# Patient Record
Sex: MALE | Race: WHITE | NOT HISPANIC OR LATINO | URBAN - METROPOLITAN AREA
[De-identification: names, ages, dates, MRNs, and addresses within clinical notes are randomized per-mention and may not be internally consistent; named-entity substitution may affect disease eponyms.]

---

## 2017-01-06 ENCOUNTER — GENERIC CONVERSION - ENCOUNTER (OUTPATIENT)
Dept: OTHER | Facility: OTHER | Age: 52
End: 2017-01-06

## 2017-09-19 ENCOUNTER — GENERIC CONVERSION - ENCOUNTER (OUTPATIENT)
Dept: OTHER | Facility: OTHER | Age: 52
End: 2017-09-19

## 2017-11-09 ENCOUNTER — GENERIC CONVERSION - ENCOUNTER (OUTPATIENT)
Dept: OTHER | Facility: OTHER | Age: 52
End: 2017-11-09

## 2018-01-10 NOTE — PROGRESS NOTES
Medical Alert: Tuberculosis    High Blood Pressure    Other  Medications: Ultram    Paxil    Cetirizine Oral Tablet [Zyrtec]    Oxycontin    Lexapro  Allergies:      Dogs    other  Since Last Visit: Medical Alert: No Change    Medications: No Change    Allergies:        No Change  Pain Scale Type: Numeric Pain ScalePain Level: 0  Description:    Patient Health Assessment    Date:            11/09/2017  Blood Pressure:  129/89  Pulse:           84  Age:             52  Weight:          160 lbs  Height/Length:   5' 7"  Body Mass Index: 25 1  Provider:        SANJEEV  Clinic:          [facility]        Pt presents for #MOL   PMH review, no changes  Pt understands and consents  Applied topical benzocaine, administered 1 carps 2% lido 1:100k epi via  infiltration  Prep with 245 carbide on high speed  Caries removed with round  carbide on slow speed  Placed tofflemire/palodent matrix  Isolation with cotton   rolls and dri-angles  Etch with 37% H2PO4, rinse, dry  Applied Vividbond  with 15 second scrubx2, gentle air dry and light cured  Restored with Beautifil   flowable and Alpha II composite shade  and light cured  Refined with finishing   burs, polished with enhance point  Verified occlusion and contacts  Pt left  satisfied and ambulatory  patient doesn't like the un even look of enamel on the incisal of #8 and #9 and   would like composite build ups on the anteriors to correct of the enamel  defect      NV: restorations on #8 and 9    SA/MQ    ----- Signed on Thursday, November 09, 2017 at 9:51:12 AM  -----  ----- Provider: SANJEEV - [facility], Dentist -- Clinic: [facility] -----

## 2018-01-10 NOTE — PROGRESS NOTES
Patient Health Assessment    Date:            01/06/2017  Blood Pressure:  128/925  Pulse:           80  Age:             51  Weight:          165 lbs  Height/Length:   5' 7"  Body Mass Index: 25 7  Provider:        JEREMÍAS  Clinic:          [facility] Alert: Artificial Joints    Tuberculosis  Medications: pantoprazole Extended Release Tablet [Protonix]    Ultram    Paxil    Cetirizine Oral Tablet [Zyrtec]    Flonase    Oxycontin  Pain Scale Type: Numeric Pain ScalePain Level: 0  Description:  S: I feel like my fillings will fall out, I am going to [place] and I do not want   anything coming apart  O: Old restorations present in many teeth  #3 resin does not show anything  on film but the resin tooth interface looks suspect to me  A: Older teeth w large restorations are present  PLAN:     Pt needs prophy and then a treatment planning sessions  If he wants a  restorative treatment plan that involves full coverage then we can provide that   for him  I told him it would cost thousands of dollars  I also told him that we can patch em , pull em and plate em if that is  the plan that he would like to pursue  He asked if he should go to a dental school and have his mouth redone and  I told him that was an option       nv  prophy w doctors at [facility] and then he can discuss his next step from  the conversation with the doctors at the residency program     ----- Signed on Friday, January 06, 2017 at 10:45:21 AM  -----  ----- Provider: JEREMÍAS - Niyah Gorman DMD -- Clinic: [facility] -----

## 2018-01-12 NOTE — PROGRESS NOTES
Patient Health Assessment    Date:            09/19/2017  Blood Pressure:  122/84  Pulse:           68  Age:             52  Weight:          0 lbs  Height/Length:   0' 0"  Body Mass Index: 0 0  Provider:        JEREMÍAS  Clinic:          [address]: Tuberculosis    High Blood Pressure    Other  Medications: Ultram    Paxil    Cetirizine Oral Tablet [Zyrtec]    Oxycontin    Lexapro  Allergies:      Dogs    other  Since Last Visit: Medical Alert: No Change    Medications: No Change    Allergies:        No Change  Pain Scale Type: Numeric Pain ScalePain Level: 0  Description:    Pt presented for recall appointment  Pt is taking anxiety meds and he's  always worried or thinking that his fillings are going to fall out or break(see   previous notes as well)  Today he said that he has some discomfort on the  right side "only when doing hard physically work like riding the motorcycle" he   said  Pt not sure where on the right side, pointing at times at #3, 29 and 31  Periodic exam performed  Tooth #3 will need DOL filling since the existing filling is cracked, that  might be one of the reason that patient is experiencing some discomfort on  the right side  The bottom teeth #31,29 tested vital and no clear pathology  on any of the teeth on the lower right noted today  Explained to pt that  after we do the filling on #3 will follow up again and see if the discomfort on   the right side went away  Pt agreed  Noticed a small ulcerated area on the palate  Palatal of #3  Alike to burned  palate or irritation  Pt did not remember burning palate  No discomfort  Will  check again next visit when pt returns for filling  Today prophy completed  Used hand scalers and cavitron to clean supragingival plaque and calculus  Polished with prophy paste and cup  Advised for a better brushing and flossing  Pt agreed  Pt left in good health  Fee for resin #3 DOL for next visit given to pt      NV: resin #3    HARLEY Joyner    ----- Signed on Tuesday, September 19, 2017 at 10:54:32 AM  -----  ----- Provider: Hien Valencia -- Clinic: [facility] -----

## 2018-01-15 NOTE — RESULT NOTES
Verified Results  (1) TISSUE EXAM [id number] 11:56AM Aster Haider     Test Name Result Flag Reference   LAB AP CASE REPORT (Report)     Surgical Pathology Report             Case: [id number]                   Authorizing Provider: Abdiaziz Youngblood MD  Collected:      12/14/2016 1156        Ordering Location:   [facility] Received:      12/14/2016 6087                    Operating Room                                 Pathologist:      Valentino Poor, MD                             Specimen:  Esophagus, Cold bx eval Chu's distal esophagus   LAB AP FINAL DIAGNOSIS (Report)     A  Distal esophagus, biopsy:    - 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 or [facility]  They may not be cleared or approved by the [place]  Food and   Drug Administration  The FDA has determined that such clearance or   approval is not necessary  These tests are used for clinical purposes  They should not be regarded as investigational or for research  This   laboratory has been approved by CLIA 88, designated as a high-complexity   laboratory and is qualified to perform these tests  LAB AP GROSS DESCRIPTION (Report)     A  The specimen is received in formalin, labeled with the patient's name   and hospital number, and is designated biopsy evaluate Chu's distal   esophagus  The specimen consists of 3 tan soft tissue fragments each   measuring 0 2-0 3 cm  Entirely submitted  One cassette      Note: The estimated total formalin fixation time based upon information   provided by the submitting clinician and the standard processing schedule   is 26 0 hours      MAC

## 2018-06-08 DIAGNOSIS — K21.9 CHRONIC GERD: Primary | ICD-10-CM

## 2018-06-08 RX ORDER — PANTOPRAZOLE SODIUM 40 MG/1
40 TABLET, DELAYED RELEASE ORAL DAILY
Qty: 90 TABLET | Refills: 2 | Status: SHIPPED | OUTPATIENT
Start: 2018-06-08 | End: 2019-03-26 | Stop reason: SDUPTHER

## 2019-01-15 DIAGNOSIS — K02.61 DENTAL CARIES ON SMOOTH SURFACE LIMITED TO ENAMEL: ICD-10-CM

## 2019-03-26 DIAGNOSIS — K21.9 CHRONIC GERD: ICD-10-CM

## 2019-03-26 RX ORDER — PANTOPRAZOLE SODIUM 40 MG/1
40 TABLET, DELAYED RELEASE ORAL DAILY
Qty: 90 TABLET | Refills: 2 | Status: SHIPPED | OUTPATIENT
Start: 2019-03-26 | End: 2019-03-29 | Stop reason: SDUPTHER

## 2019-03-29 DIAGNOSIS — K21.9 CHRONIC GERD: ICD-10-CM

## 2019-04-01 RX ORDER — PANTOPRAZOLE SODIUM 40 MG/1
40 TABLET, DELAYED RELEASE ORAL DAILY
Qty: 90 TABLET | Refills: 2 | Status: SHIPPED | OUTPATIENT
Start: 2019-04-01 | End: 2020-04-17

## 2020-02-07 ENCOUNTER — DOCUMENTATION (OUTPATIENT)
Dept: GASTROENTEROLOGY | Facility: CLINIC | Age: 55
End: 2020-02-07

## 2020-02-07 NOTE — LETTER
2/7/2020    Dear Amy Johnson,    Review of our records shows you are due for the following:    EGD    Please call the following office to schedule your appointment:    [facility]    We look forward to hearing from you      Sincerely,    Dr Jose Luis Joyner

## 2020-04-16 DIAGNOSIS — K21.9 CHRONIC GERD: ICD-10-CM

## 2020-04-17 RX ORDER — PANTOPRAZOLE SODIUM 40 MG/1
TABLET, DELAYED RELEASE ORAL
Qty: 90 TABLET | Refills: 0 | Status: SHIPPED | OUTPATIENT
Start: 2020-04-17 | End: 2020-08-03

## 2020-05-12 ENCOUNTER — TELEPHONE (OUTPATIENT)
Dept: GASTROENTEROLOGY | Facility: CLINIC | Age: 55
End: 2020-05-12

## 2020-05-21 DIAGNOSIS — Z20.822 ENCOUNTER FOR LABORATORY TESTING FOR COVID-19 VIRUS: ICD-10-CM

## 2020-05-21 PROCEDURE — U0003 INFECTIOUS AGENT DETECTION BY NUCLEIC ACID (DNA OR RNA); SEVERE ACUTE RESPIRATORY SYNDROME CORONAVIRUS 2 (SARS-COV-2) (CORONAVIRUS DISEASE [COVID-19]), AMPLIFIED PROBE TECHNIQUE, MAKING USE OF HIGH THROUGHPUT TECHNOLOGIES AS DESCRIBED BY CMS-2020-01-R: HCPCS

## 2020-05-23 LAB — SARS-COV-2 RNA SPEC QL NAA+PROBE: NOT DETECTED

## 2020-05-26 ENCOUNTER — ANESTHESIA EVENT (OUTPATIENT)
Dept: GASTROENTEROLOGY | Facility: HOSPITAL | Age: 55
End: 2020-05-26

## 2020-05-27 ENCOUNTER — HOSPITAL ENCOUNTER (OUTPATIENT)
Dept: GASTROENTEROLOGY | Facility: HOSPITAL | Age: 55
Setting detail: OUTPATIENT SURGERY
Discharge: HOME | End: 2020-05-27
Attending: INTERNAL MEDICINE | Admitting: INTERNAL MEDICINE
Payer: MEDICARE

## 2020-05-27 ENCOUNTER — ANESTHESIA (OUTPATIENT)
Dept: GASTROENTEROLOGY | Facility: HOSPITAL | Age: 55
End: 2020-05-27

## 2020-05-27 VITALS
OXYGEN SATURATION: 97 % | HEIGHT: 67 IN | HEART RATE: 95 BPM | WEIGHT: 187.39 LBS | TEMPERATURE: 98 F | BODY MASS INDEX: 29.41 KG/M2 | DIASTOLIC BLOOD PRESSURE: 90 MMHG | SYSTOLIC BLOOD PRESSURE: 139 MMHG | RESPIRATION RATE: 18 BRPM

## 2020-05-27 DIAGNOSIS — K21.9 CHRONIC GERD: ICD-10-CM

## 2020-05-27 PROCEDURE — 88305 TISSUE EXAM BY PATHOLOGIST: CPT | Performed by: PATHOLOGY

## 2020-05-27 PROCEDURE — 43239 EGD BIOPSY SINGLE/MULTIPLE: CPT | Performed by: INTERNAL MEDICINE

## 2020-05-27 RX ORDER — AMLODIPINE BESYLATE AND BENAZEPRIL HYDROCHLORIDE 10; 20 MG/1; MG/1
1 CAPSULE ORAL DAILY
COMMUNITY

## 2020-05-27 RX ORDER — PROPOFOL 10 MG/ML
INJECTION, EMULSION INTRAVENOUS AS NEEDED
Status: DISCONTINUED | OUTPATIENT
Start: 2020-05-27 | End: 2020-05-27 | Stop reason: SURG

## 2020-05-27 RX ORDER — QUETIAPINE FUMARATE 100 MG/1
100 TABLET, FILM COATED ORAL
COMMUNITY

## 2020-05-27 RX ORDER — SODIUM CHLORIDE, SODIUM LACTATE, POTASSIUM CHLORIDE, CALCIUM CHLORIDE 600; 310; 30; 20 MG/100ML; MG/100ML; MG/100ML; MG/100ML
125 INJECTION, SOLUTION INTRAVENOUS CONTINUOUS
Status: DISCONTINUED | OUTPATIENT
Start: 2020-05-27 | End: 2020-05-31 | Stop reason: HOSPADM

## 2020-05-27 RX ORDER — LIDOCAINE HYDROCHLORIDE 20 MG/ML
INJECTION, SOLUTION INFILTRATION; PERINEURAL AS NEEDED
Status: DISCONTINUED | OUTPATIENT
Start: 2020-05-27 | End: 2020-05-27 | Stop reason: SURG

## 2020-05-27 RX ADMIN — SODIUM CHLORIDE, SODIUM LACTATE, POTASSIUM CHLORIDE, AND CALCIUM CHLORIDE: .6; .31; .03; .02 INJECTION, SOLUTION INTRAVENOUS at 08:20

## 2020-05-27 RX ADMIN — LIDOCAINE HYDROCHLORIDE 5 ML: 20 INJECTION, SOLUTION INFILTRATION; PERINEURAL at 08:42

## 2020-05-27 RX ADMIN — PROPOFOL 200 MG: 10 INJECTION, EMULSION INTRAVENOUS at 08:42

## 2020-05-29 ENCOUNTER — TELEPHONE (OUTPATIENT)
Dept: GASTROENTEROLOGY | Facility: CLINIC | Age: 55
End: 2020-05-29

## 2020-08-01 DIAGNOSIS — K21.9 CHRONIC GERD: ICD-10-CM

## 2020-08-03 NOTE — TELEPHONE ENCOUNTER
1200 Reyna Cuellar called refill pantoprazole 40 mg   Please call [facility] [place] at [phone] ty

## 2020-08-04 RX ORDER — PANTOPRAZOLE SODIUM 40 MG/1
TABLET, DELAYED RELEASE ORAL
Qty: 90 TABLET | Refills: 3 | Status: SHIPPED | OUTPATIENT
Start: 2020-08-04 | End: 2021-07-28

## 2021-07-27 DIAGNOSIS — K21.9 CHRONIC GERD: ICD-10-CM

## 2021-07-28 RX ORDER — PANTOPRAZOLE SODIUM 40 MG/1
TABLET, DELAYED RELEASE ORAL
Qty: 90 TABLET | Refills: 0 | Status: SHIPPED | OUTPATIENT
Start: 2021-07-28 | End: 2021-11-02

## 2021-09-09 ENCOUNTER — OFFICE VISIT (OUTPATIENT)
Dept: DENTISTRY | Facility: CLINIC | Age: 56
End: 2021-09-09

## 2021-09-09 VITALS — HEART RATE: 84 BPM | SYSTOLIC BLOOD PRESSURE: 128 MMHG | DIASTOLIC BLOOD PRESSURE: 90 MMHG | TEMPERATURE: 98 F

## 2021-09-09 DIAGNOSIS — Z01.21 ENCOUNTER FOR DENTAL EXAMINATION AND CLEANING WITH ABNORMAL FINDINGS: Primary | ICD-10-CM

## 2021-09-09 PROCEDURE — D9944 OCCLUSAL GUARD - HARD APPLIANCE, FULL ARCH: HCPCS | Performed by: DENTIST

## 2021-09-09 NOTE — PROGRESS NOTES
Leslie Cooper presents to [facility] for delivery of occlusal guard   was soaked in warm warm  Pt inserted max occlusal guard  OHI was reviewed  Post delivery instructions were given to patient  ASSESSMENT  #3 DOL caries and #20 recession    PLAN  Tooth #3 DOL composite troy still unaddressed from recall exam April 2021  Pt informed that he needs filling  Pt reported that he has sensitivity in implant when riding motorcycle or any activities that require a lot of movement  Pt pointed to the area of #19  This sensitivity is occurring because of recession on #20  Pt needs application of GLUMA and use prophy brushes at home  OHI needs to be reinforced at next visit or 6mrc  Troy planned for #24 and #27 were not completed  Pt is not having pain or sensntivity  No caries present      DONE TODAY  Delivery of maxillary occlusal guard    NV1: #3 DOL composite troy and application of dentin desensitizer #20  NV2: 6mrc and prophy    Dr Redd Smith

## 2021-11-02 DIAGNOSIS — K21.9 CHRONIC GERD: ICD-10-CM

## 2021-11-02 RX ORDER — PANTOPRAZOLE SODIUM 40 MG/1
TABLET, DELAYED RELEASE ORAL
Qty: 90 TABLET | Refills: 0 | Status: SHIPPED | OUTPATIENT
Start: 2021-11-02

## 2022-03-21 ENCOUNTER — CLINICAL SUPPORT (OUTPATIENT)
Dept: DENTISTRY | Facility: CLINIC | Age: 57
End: 2022-03-21

## 2022-03-21 VITALS — DIASTOLIC BLOOD PRESSURE: 84 MMHG | TEMPERATURE: 97.8 F | HEART RATE: 109 BPM | SYSTOLIC BLOOD PRESSURE: 121 MMHG

## 2022-03-21 DIAGNOSIS — Z01.21 ENCOUNTER FOR DENTAL EXAMINATION AND CLEANING WITH ABNORMAL FINDINGS: Primary | ICD-10-CM

## 2022-03-21 PROCEDURE — D2393 RESIN-BASED COMPOSITE - 3 SURFACES, POSTERIOR: HCPCS | Performed by: DENTIST

## 2022-03-21 NOTE — PROGRESS NOTES
Composite Filling    Aranza Adam presents for composite filling on tooth #3  Patient had pain on tooth #3  PMH reviewed, no changes  PA and BW of tooth #3 was taken  Patient has caries on the distal of the tooth  Clinical exam was done by Dr Christiane Ashley and margin of the restoration was open and patient had caries  Discussed with patient need for RCT if pulp exposure occurs or in future if pulp is inflamed  Patient told that he will need crown and might also need crown lengthening  Pt understands and consents  Applied topical benzocaine, administered 1 carps 2% lido 1:100k epi via local infiltration  Removed existing composite and prepped tooth #3 with 245 carbide on high speed  Caries removed with round carbide on slow speed  Tooth was checked with explorer and caries indicator to confirm that caries are excavated  The restoration margin was at the level of the gingiva  Placed garison matrix  Isolation with cotton rolls and dri-angles    Placed Glass ionomer at the gingival level due to lack of isolation  Etch with 37% H2PO4, rinse, dry  Applied Adhese with 20 second scrub once, gentle air dry and light cured for 10s  Restored with flowable and then Tetric bulk jay shade A2 and light cured  Refined with finishing burs, polished with enhance point  Verified occlusion and contacts  Took another BW, it shows radiolucency which can be root depression according to Dr Lindsay Villar  We will not know until tooth is prepped for a crown  If there is a root depression, crown lengthening can't be performed due to distal furcation exposure which means the tooth won't have 2 mm ferrule for the crown and crown can't be done  So Dr Lindsay Villar suggested waiting and keeping an eye on the restoration for now       Nv: resins

## 2022-04-01 ENCOUNTER — NURSE TRIAGE (OUTPATIENT)
Dept: OTHER | Facility: OTHER | Age: 57
End: 2022-04-01

## 2022-04-01 NOTE — TELEPHONE ENCOUNTER
Reason for Disposition   [1] Numbness (i e , loss of sensation) of the face, arm / hand, or leg / foot on one side of the body AND [2] gradual onset (e g , days to weeks) AND [3] present now    Answer Assessment - Initial Assessment Questions  1  SYMPTOM: "What is the main symptom you are concerned about?" (e g , weakness, numbness)      Right arm below shoulder into bicep, can't raise my arm    2  ONSET: "When did this start?" (minutes, hours, days; while sleeping)      Last week    3  LAST NORMAL: "When was the last time you were normal (no symptoms)?"      Last week    4  PATTERN "Does this come and go, or has it been constant since it started?"  "Is it present now?"      Constant    5  CARDIAC SYMPTOMS: "Have you had any of the following symptoms: chest pain, difficulty breathing, palpitations?"      Denies    6  NEUROLOGIC SYMPTOMS: "Have you had any of the following symptoms: headache, dizziness, vision loss, double vision, changes in speech, unsteady on your feet?"      Denies    7   OTHER SYMPTOMS: "Do you have any other symptoms?"    Fatigue and nauseated    Protocols used: NEUROLOGIC DEFICIT-ADULT-

## 2022-04-01 NOTE — TELEPHONE ENCOUNTER
Regarding: Had dental work done now having numbness in arm   ----- Message from Sandra Perez sent at 4/1/2022  5:30 PM EDT -----  '' I  Had dental work done about two weeks ago and now I'm having numbness in my arm concern

## 2022-04-13 ENCOUNTER — OFFICE VISIT (OUTPATIENT)
Dept: DENTISTRY | Facility: CLINIC | Age: 57
End: 2022-04-13

## 2022-04-13 VITALS — DIASTOLIC BLOOD PRESSURE: 90 MMHG | TEMPERATURE: 95.5 F | SYSTOLIC BLOOD PRESSURE: 125 MMHG | HEART RATE: 93 BPM

## 2022-04-13 DIAGNOSIS — K02.61 DENTAL CARIES ON SMOOTH SURFACE LIMITED TO ENAMEL: Primary | ICD-10-CM

## 2022-04-13 PROCEDURE — D2332 RESIN-BASED COMPOSITE - 3 SURFACES, ANTERIOR: HCPCS | Performed by: DENTIST

## 2022-04-13 PROCEDURE — D2335 RESIN-BASED COMPOSITE - 4 OR MORE SURFACES OR INVOLVING INCISAL ANGLE (ANTERIOR): HCPCS | Performed by: DENTIST

## 2022-04-13 NOTE — PROGRESS NOTES
Sandre Sieve presents for composite fillings #24-DIF and #27-DIF  PMH reviewed, no changes noted  These fillings are for non-carious lesions limited to enamel (small enamel fractures)  Showed patient the areas in question in the mirror before proceeding - patient consented to fillings in these areas  Limited to enamel - no anesthesia given  Patient reported being comfortable throughout procedure  Prepped tooth #24 and #27 with small round bur on high speed  Isolation with cotton rolls  Etched with 37% H2PO4, scrubbed with Adhese, and restored with flowable shade A2  Polished restorations  Verified occlusion  Please monitor right side floor of mouth  Believe that suction caused bruising  Please rule out pathology at next appt  The patient was dismissed satisfied with treatment      NV: #3 pulp testing and re-evaluation for restorability with Dr Levi Mantilla

## 2022-06-13 ENCOUNTER — OFFICE VISIT (OUTPATIENT)
Dept: DENTISTRY | Facility: CLINIC | Age: 57
End: 2022-06-13

## 2022-06-13 VITALS — SYSTOLIC BLOOD PRESSURE: 130 MMHG | DIASTOLIC BLOOD PRESSURE: 88 MMHG | TEMPERATURE: 97.1 F | HEART RATE: 101 BPM

## 2022-06-13 DIAGNOSIS — Z01.21 ENCOUNTER FOR DENTAL EXAMINATION AND CLEANING WITH ABNORMAL FINDINGS: Primary | ICD-10-CM

## 2022-06-13 PROCEDURE — D0191 ASSESSMENT OF A PATIENT: HCPCS | Performed by: DENTIST

## 2022-06-13 NOTE — PROGRESS NOTES
Patient came in for assessment of tooth #3  Tooth #3 was excavated and restored with Glass ionomer last visit  Patient is not complaining of pain  He is not sensitive to cold, hot or percussion  PA of tooth #3 was taken and it didn't show any PAP  Endo ice test was performed and the tooth is vital  There was no lingering pain  Patient was told that he will need a crown on that tooth  Tooth #3 will be treatment planned for crown and pre-authorization will be send out to insurance  Meanwhile tooth #3 needs to be under evaluation for any symptoms  Patient complained that he is missing a restoration  Clinical exam was performed  Tooth #19 is an implant supported screw retained crown and it was missing the restoration material that used to cover the screw site  Food was dislodged from the opening and it was rinsed out  Rigo tape and cool dam was placed to isolate and protect the screw before placing and curing composite  Flow-able composite shade A2 was placed and cured to close the hole  Occlusion was checked with articulating paper and adjusted with polishing bur on high speed  Patient left in stable condition         Nv: crown prep on tooth #3

## 2022-08-08 ENCOUNTER — OFFICE VISIT (OUTPATIENT)
Dept: DENTISTRY | Facility: CLINIC | Age: 57
End: 2022-08-08

## 2022-08-08 VITALS — TEMPERATURE: 97.7 F

## 2022-08-08 DIAGNOSIS — K08.539 FRACTURE OF DENTAL RESTORATION: Primary | ICD-10-CM

## 2022-08-08 PROCEDURE — D2940 PROTECTIVE RESTORATION: HCPCS | Performed by: DENTIST

## 2022-08-08 NOTE — PROGRESS NOTES
Composite Filling    Kassy Hernandez presents for emergency appt  Due to pain in #3 where previous GI restoration came out  Pt  Reports pain is 3/10 right now and is more sensitive due to missing restoration  PMH reviewed, no changes  ASA2  Took PA of tooth #3 and saw no PAP  Checked tooth with endo ice and pt  Had normal responses on tooth #3  Explained to pt  That his sensitivity is most likely because of the missing restoration  Pt  April Cope and agreed  Pt  Was previously treatment planned for a crown on #3 but no authorization had been sent for crown or core build up  Treatment planned core build up for #3 to be authorized  Informed pt  That these needed to be authorized through insurance before beginning and pt  Understood  Informed pt that we would not be drilling the tooth today and inquired if he would be okay without anesthesia  Informed pt  If he felt sensitivity we would provide anesthesia  Pt  Agreed to proceed without anesthesia  Used tofflemire matrix around tooth #3 and built up with glass ionomer restorative material  Checked occlusion and adjusted for pt 's bite  Informed pt  that this was temporary and that he needed to return to begin build up + crown prep once the authorization was approved       NV: core build up #3

## 2022-09-02 ENCOUNTER — OFFICE VISIT (OUTPATIENT)
Dept: DENTISTRY | Facility: CLINIC | Age: 57
End: 2022-09-02

## 2022-09-02 VITALS — SYSTOLIC BLOOD PRESSURE: 129 MMHG | HEART RATE: 103 BPM | DIASTOLIC BLOOD PRESSURE: 86 MMHG | TEMPERATURE: 97.6 F

## 2022-09-02 DIAGNOSIS — K08.89 PAIN IN A TOOTH OR TEETH: Primary | ICD-10-CM

## 2022-09-02 RX ORDER — CHLORHEXIDINE GLUCONATE 0.12 MG/ML
15 RINSE ORAL 2 TIMES DAILY
Qty: 120 ML | Refills: 0 | Status: SHIPPED | OUTPATIENT
Start: 2022-09-02 | End: 2022-09-06 | Stop reason: SDUPTHER

## 2022-09-02 NOTE — PROGRESS NOTES
presents for a Limited exam  Verbal consent for treatment given in addition to the forms  Reviewed health history - Patient is ASA   Consents signed: Yes     Perio: Heavy bleeding between #2 and 3 due to food impaction   Pain Scale: 0  Caries Assessment: medium    Gave 2 carp of 4% septo with 1 /100k epi  Explained to pt necessity for perio debridement due to food impaction proximal to #2 and 3 decay noticed on mesial of #2 and patient should return for the MO on #2  NV: recall and evaluate gingiva proximal to #2 and 3    NV1  MO #2  NV2   Work on crown #3     MQ

## 2022-09-06 ENCOUNTER — OFFICE VISIT (OUTPATIENT)
Dept: DENTISTRY | Facility: CLINIC | Age: 57
End: 2022-09-06

## 2022-09-06 VITALS — SYSTOLIC BLOOD PRESSURE: 133 MMHG | DIASTOLIC BLOOD PRESSURE: 95 MMHG | TEMPERATURE: 98 F

## 2022-09-06 DIAGNOSIS — K08.89 PAIN IN A TOOTH OR TEETH: ICD-10-CM

## 2022-09-06 DIAGNOSIS — Z00.00 ENCOUNTER FOR SCREENING AND PREVENTATIVE CARE: Primary | ICD-10-CM

## 2022-09-06 PROBLEM — M51.36 DDD (DEGENERATIVE DISC DISEASE), LUMBAR: Status: ACTIVE | Noted: 2021-12-17

## 2022-09-06 PROBLEM — M54.2 NECK PAIN: Status: ACTIVE | Noted: 2017-08-25

## 2022-09-06 PROBLEM — F41.1 GENERALIZED ANXIETY DISORDER: Status: ACTIVE | Noted: 2017-08-25

## 2022-09-06 PROBLEM — F33.41 RECURRENT MAJOR DEPRESSIVE DISORDER, IN PARTIAL REMISSION (HCC): Status: ACTIVE | Noted: 2020-04-03

## 2022-09-06 PROBLEM — F10.10 ALCOHOL ABUSE: Status: ACTIVE | Noted: 2021-09-01

## 2022-09-06 PROBLEM — I10 ESSENTIAL HYPERTENSION: Status: ACTIVE | Noted: 2017-08-25

## 2022-09-06 PROBLEM — F45.41 STRESS HEADACHES: Status: ACTIVE | Noted: 2017-08-25

## 2022-09-06 PROBLEM — F17.201 TOBACCO ABUSE, IN REMISSION: Status: ACTIVE | Noted: 2017-10-06

## 2022-09-06 PROBLEM — H90.3 SENSORINEURAL HEARING LOSS (SNHL) OF BOTH EARS: Status: ACTIVE | Noted: 2021-09-01

## 2022-09-06 PROBLEM — R10.13 DYSPEPSIA: Status: ACTIVE | Noted: 2021-09-01

## 2022-09-06 PROBLEM — M51.369 DDD (DEGENERATIVE DISC DISEASE), LUMBAR: Status: ACTIVE | Noted: 2021-12-17

## 2022-09-06 PROCEDURE — D0274 BITEWINGS - 4 RADIOGRAPHIC IMAGES: HCPCS

## 2022-09-06 PROCEDURE — D1110 PROPHYLAXIS - ADULT: HCPCS

## 2022-09-06 RX ORDER — CHLORHEXIDINE GLUCONATE 0.12 MG/ML
15 RINSE ORAL 2 TIMES DAILY
Qty: 120 ML | Refills: 0 | Status: SHIPPED | OUTPATIENT
Start: 2022-09-06

## 2022-09-06 NOTE — PROGRESS NOTES
Reviewed Medical History with pt  ASA:II  Chief Complaint:upset he's not yet scheduled for rest  Or crown-says  doesn't answer phone or call back  Billing Jeyson discussed with him, repeated to him his insurance grabHalo takes about one month for response and they just submitted to insurance Aug  16 so should be around Sep 16 we hear back  Pt  Neva Gaitan since he's been coming in several times prior to Aug  Stating he needs crown #3 and it wasn't previously submitted  4 Bitewings, Periodic Exam, Adult Prophy  Intraoral exam:nf  Pt twitching entire body during trt  Oral Hygiene: generalized heavy plaque, food trap #2,3 ,moder calculus lower anters  , lt -moder  bleeding  Spot probed  Perio :4,5mm posters  Hand & ultras  scaled, Flossed, polished  Patient tolerated well  Dr Ledesma People examined:found more decay #08,3,17,88  Following apt pt  began taking pictures of my computer screen and private notes, was asked not to photograph my personal info  Pt left satisfied, thanked me for being so thorough  DR FIORE called in another script for Chlorhexidine    NV:Core, crown #3-apt was offered Sep  26 to pt  #15 possible endo  Needs:rest  #2, 7,14  6mos per ex pc pro    Rishi Mohr , PHDHP

## 2022-09-26 ENCOUNTER — OFFICE VISIT (OUTPATIENT)
Dept: DENTISTRY | Facility: CLINIC | Age: 57
End: 2022-09-26

## 2022-09-26 VITALS — DIASTOLIC BLOOD PRESSURE: 91 MMHG | SYSTOLIC BLOOD PRESSURE: 124 MMHG | TEMPERATURE: 97.1 F | HEART RATE: 107 BPM

## 2022-09-26 DIAGNOSIS — T14.8XXA FRACTURE: Primary | ICD-10-CM

## 2022-09-26 PROCEDURE — D2950 CORE BUILDUP, INCLUDING ANY PINS WHEN REQUIRED: HCPCS | Performed by: DENTIST

## 2022-09-26 NOTE — PROGRESS NOTES
Core Build-up #3     Julito Rodrigues Tranquillity presents to clinic for core build up #3  Pt  Has been waiting a long time for insurance confirmation for approval/denial and is frustrated with the waiting time  Pt  Has other fillings to do in his mouth but expresses that he is mostly concerned with "this tooth not breaking so much that we need to taken it out " Explained to pt  After looking intraorally the tooth appears savable and will just need the buildup and crown as planned  Pt  Understood and agreed to tx  Informed pt  That other caries are pretty large and require treatment but pt  Opts to continue on with tooth #3 and wants to finish the crown prior to beginning fillings  PMH reviewed, no changes  Pt  Is ASA2  Tooth #3 is not previously RCT  Temporary filling has since fallen off  Applied topical benzocaine, administered 1 carp 4% septo 1:100k epi via local infiltration  Etch with 37% H2PO4, rinse, dry  Applied universal adhesive with 20 second scrub once, gentle air dry and light cured for 10s  Applied multicore to canals  Verified occlusion and contacts  Pt left satisfied       NV: crown prep  NNV: resins

## 2022-11-25 ENCOUNTER — OFFICE VISIT (OUTPATIENT)
Dept: DENTISTRY | Facility: CLINIC | Age: 57
End: 2022-11-25

## 2022-11-25 VITALS — DIASTOLIC BLOOD PRESSURE: 80 MMHG | TEMPERATURE: 97.4 F | SYSTOLIC BLOOD PRESSURE: 117 MMHG | HEART RATE: 93 BPM

## 2022-11-25 DIAGNOSIS — Z01.20 ENCOUNTER FOR DENTAL EXAMINATION AND CLEANING WITHOUT ABNORMAL FINDINGS: Primary | ICD-10-CM

## 2022-11-25 NOTE — PROGRESS NOTES
Patient presented for emergency exam for loose implant crown #19  ASA: II  Pain: 0    Clinical exam: tooth very mobile  Had to be tightened multiple times in the paste, heavy occlusion and bulky  1 PA taken today, implant looks stable  Dr Piter Thakur examined pt and determined we will redo the crown at no charge to the patient  [place] unscrewed and removed today  Rinsed with peridex and healing abutment placed over implant  Radiograph taken to confirm seating       Next step, new final impression to send to lab    NV: final impression implant #19

## 2022-11-29 ENCOUNTER — OFFICE VISIT (OUTPATIENT)
Dept: DENTISTRY | Facility: CLINIC | Age: 57
End: 2022-11-29

## 2022-11-29 VITALS — DIASTOLIC BLOOD PRESSURE: 95 MMHG | SYSTOLIC BLOOD PRESSURE: 129 MMHG | TEMPERATURE: 97.9 F | HEART RATE: 103 BPM

## 2022-11-29 DIAGNOSIS — T14.8XXA FRACTURE: Primary | ICD-10-CM

## 2022-11-29 NOTE — PROGRESS NOTES
[place] Prep #3    Heath Birch presents for crown prep #3  PMH reviewed, no changes  Pt  Is ASA2  Fabricated bite matrix with bluprint and triple tray  Applied topical benzocaine, administered 2 carps 4% articaine 1:100k epi via local infiltration  Tooth prepped with reductions for full zirconia crown  Made provisional crown with provisa and matrix, refined with adwoa on high speed  Confirmed provisional covers margins with no overhangs  Cemented provisional crown using Provicell  Removed excess cement, occlusion and contacts verified  Pt left satisfied and ambulatory      NV: refine prep #3, final impression

## 2022-12-01 ENCOUNTER — OFFICE VISIT (OUTPATIENT)
Dept: DENTISTRY | Facility: CLINIC | Age: 57
End: 2022-12-01

## 2022-12-01 VITALS — HEART RATE: 92 BPM | SYSTOLIC BLOOD PRESSURE: 117 MMHG | DIASTOLIC BLOOD PRESSURE: 79 MMHG | TEMPERATURE: 97.8 F

## 2022-12-01 DIAGNOSIS — K02.62 DENTIN CARIES: Primary | ICD-10-CM

## 2022-12-01 NOTE — PROGRESS NOTES
Composite Filling    Elvira Chinchilla presents for composite filling #15-MO  PMH reviewed, no changes  Pt  Is ASA2 and reports sensitivity in UR to #3 and was told to monitor symptoms to determine if #3 requires RCT before final crown  Discussed with patient need for RCT if pulp exposure occurs or in future if pulp is inflamed  Pt understands and consents  Applied topical benzocaine, administered 1 carp 4% articaine 1:100k epi via local infiltration  Isolated tooth with Dry Shield  Prepped tooth #15-MO with 245 carbide on high speed  Caries removed with round carbide on slow speed  Placed Don matrix  Isolation with cotton rolls and dri-angles and DryShield  Placed Limelight  Etch with 37% H2PO4, rinse, dry  Applied Adhese with 20 second scrub once, gentle air dry and light cured for 10s  Restored with Tetric bulk jay shade A2 and light cured  Refined with finishing burs, polished with enhance point  Verified occlusion and contacts  Pt left satisfied      NV: resins

## 2022-12-12 ENCOUNTER — OFFICE VISIT (OUTPATIENT)
Dept: DENTISTRY | Facility: CLINIC | Age: 57
End: 2022-12-12

## 2022-12-12 VITALS — DIASTOLIC BLOOD PRESSURE: 83 MMHG | HEART RATE: 105 BPM | TEMPERATURE: 98 F | SYSTOLIC BLOOD PRESSURE: 125 MMHG

## 2022-12-12 DIAGNOSIS — Z01.20 ENCOUNTER FOR DENTAL EXAMINATION: Primary | ICD-10-CM

## 2022-12-12 RX ORDER — TAMSULOSIN HYDROCHLORIDE 0.4 MG/1
0.4 CAPSULE ORAL
COMMUNITY

## 2022-12-12 RX ORDER — ZOLPIDEM TARTRATE 10 MG/1
TABLET ORAL
COMMUNITY

## 2022-12-12 RX ORDER — AMLODIPINE AND VALSARTAN 5; 160 MG/1; MG/1
1 TABLET ORAL DAILY
COMMUNITY
Start: 2022-11-22

## 2022-12-12 RX ORDER — PANTOPRAZOLE SODIUM 40 MG/1
40 TABLET, DELAYED RELEASE ORAL DAILY
COMMUNITY

## 2022-12-12 RX ORDER — BUTALBITAL, ACETAMINOPHEN AND CAFFEINE 50; 325; 40 MG/1; MG/1; MG/1
TABLET ORAL
COMMUNITY

## 2022-12-12 RX ORDER — PANTOPRAZOLE SODIUM 40 MG/1
1 TABLET, DELAYED RELEASE ORAL DAILY
COMMUNITY
Start: 2016-12-07

## 2022-12-12 RX ORDER — PAROXETINE 30 MG/1
1 TABLET, FILM COATED ORAL DAILY
COMMUNITY
Start: 2016-12-07

## 2022-12-12 RX ORDER — QUETIAPINE FUMARATE 100 MG/1
100 TABLET, FILM COATED ORAL
COMMUNITY

## 2022-12-12 RX ORDER — PAROXETINE 10 MG/1
10 TABLET, FILM COATED ORAL
COMMUNITY

## 2022-12-12 RX ORDER — GABAPENTIN 300 MG/1
CAPSULE ORAL
COMMUNITY

## 2022-12-12 RX ORDER — PAROXETINE HYDROCHLORIDE 40 MG/1
TABLET, FILM COATED ORAL
COMMUNITY

## 2022-12-12 RX ORDER — MAGNESIUM GLUCONATE 30 MG(550)
30 TABLET ORAL 2 TIMES DAILY
COMMUNITY

## 2022-12-12 RX ORDER — DICYCLOMINE HCL 20 MG
TABLET ORAL
COMMUNITY
Start: 2022-10-26

## 2022-12-12 NOTE — PROGRESS NOTES
Althea Lizarraga, 62 y o  male presents to [address] [place] [address] for emergency appointment  PMH Reviewed:    Past Medical History:   Diagnosis Date   • Anxiety    • Chronic pain disorder    • Colon polyp    • GERD (gastroesophageal reflux disease)    • Hypertension    • PTSD (post-traumatic stress disorder)         Past Surgical History:   Procedure Laterality Date   • ANKLE SURGERY     • CERVICAL SPINE SURGERY     • CHOLECYSTECTOMY     • COLONOSCOPY     • ELBOW SURGERY     • LA ESOPHAGOGASTRODUODENOSCOPY TRANSORAL DIAGNOSTIC N/A 12/14/2016    Procedure: EGD AND COLONOSCOPY;  Surgeon: Milla Fowler MD;  Location: [facility]; Service: Gastroenterology   • THUMB ARTHROSCOPY Left         Allergies   Allergen Reactions   • Other Hives     Pine and dog hair   • Short Ragweed Pollen Ext Rash   • Ace Inhibitors Cough        ASA: II  Pain Scale:0/10    Patient was last seen for a crown prep on #3 by Dr Carmen Nyhan  Temporary crown has since fallen off  It was previously discussed with patient that if tooth is symptomatic to thermal stimuli, RCT may be indicated  Temporary crown was fabricated with previously made matrix  Cemented provisional crown using Provicell  Removed excess cement, occlusion and contacts verified  It was discussed with patient that tooth will be assessed if RCT will be necessary before moving on to refining crown prep and taking final impression       NV: Assess #3 and determine if RCT is necessary

## 2022-12-14 ENCOUNTER — OFFICE VISIT (OUTPATIENT)
Dept: DENTISTRY | Facility: CLINIC | Age: 57
End: 2022-12-14

## 2022-12-14 VITALS — SYSTOLIC BLOOD PRESSURE: 128 MMHG | TEMPERATURE: 98.7 F | DIASTOLIC BLOOD PRESSURE: 91 MMHG | HEART RATE: 103 BPM

## 2022-12-14 DIAGNOSIS — Z01.21 ENCOUNTER FOR DENTAL EXAMINATION AND CLEANING WITH ABNORMAL FINDINGS: Primary | ICD-10-CM

## 2022-12-14 NOTE — PROGRESS NOTES
Limited Exam #3     Reviewed medical hist with patient  ASA-II  Pain-8/10 when temp came off    Pts CC-"My temporary crown on the upper right came off today when I was flossing and it is rough to my tongue "      Limited Exam completed by Charmayne Diones Dr Corina Sables temp and re cemented it with Provisa temp crown material     NV:1/11/2023 with Dr Dominguez to determine if #3 needs a RCT

## 2022-12-22 ENCOUNTER — OFFICE VISIT (OUTPATIENT)
Dept: DENTISTRY | Facility: CLINIC | Age: 57
End: 2022-12-22

## 2022-12-22 VITALS — DIASTOLIC BLOOD PRESSURE: 72 MMHG | SYSTOLIC BLOOD PRESSURE: 116 MMHG

## 2022-12-22 DIAGNOSIS — Z00.00 ENCOUNTER FOR SCREENING AND PREVENTATIVE CARE: Primary | ICD-10-CM

## 2022-12-22 NOTE — PROGRESS NOTES
Pt presents to clinic with temporary crown #3 out again  Reveiwed medical history    Limited exam with Dr José Alberto #3 temp  Duralon and vaseline   Explained to pt  his apt Jan 11 with  DR Laura Kimball is for final impression and to determine if tooth needs RCT  Open contact #2,3-gave proxabrush to pt      NV: final impression, evaluate #3 sensitivity  NVV reminded pt he still needs rest  trt done

## 2023-01-05 ENCOUNTER — OFFICE VISIT (OUTPATIENT)
Dept: DENTISTRY | Facility: CLINIC | Age: 58
End: 2023-01-05

## 2023-01-05 VITALS — SYSTOLIC BLOOD PRESSURE: 136 MMHG | DIASTOLIC BLOOD PRESSURE: 77 MMHG | TEMPERATURE: 98.6 F | HEART RATE: 117 BPM

## 2023-01-05 DIAGNOSIS — K02.9 CARIES: Primary | ICD-10-CM

## 2023-01-05 NOTE — PROGRESS NOTES
Pt presents to [facility] for a composite restoration on #14 DOL  H&P updated and vitals recorded  ASA: II  Pain: 0/10 some sensitivity with tooth #3     20% Benzocaine topical LA applied to the injection site  Administered 1 carp 4% articaine 1:100k epi via buccal and lingual infiltration  Discussed with patient need for RCT if pulp exposure occurs or in future if pulp is inflamed  Pt understands and consents  Prepped tooth #14 DOL with 245 carbide on high speed  Caries/ Existing restoration removed with round carbide on slow speed  Isolation with cotton rolls and dri-angles  Don sectional matrix system placed and wedged  Selectively etched enamel with 37% H2PO4, rinse, dry  Applied Adhese with 20 second scrub once, gentle air dry and light cured for 10s  Restored with flowable and Tetric bulk jay shade A3 in light-cured increments  Refined with finishing burs, polished with enhance point  Verified occlusion and contacts  Pt left satisfied  Post op instructions given  Note: 6 ML was treatment planned, but radiographs do not show any need for 6 ML resin at this time  Pt was informed that RCT is recommended for#3 due to sensitivity  Pt was advised by several provider to get RCT on #3  Pt states that he is not sensitive on that tooth at the moment and wants to "wait and see"       NV: re-evaluate #3, 2 MO resin

## 2023-01-11 ENCOUNTER — OFFICE VISIT (OUTPATIENT)
Dept: DENTISTRY | Facility: CLINIC | Age: 58
End: 2023-01-11

## 2023-01-11 VITALS — SYSTOLIC BLOOD PRESSURE: 132 MMHG | TEMPERATURE: 97.8 F | HEART RATE: 93 BPM | DIASTOLIC BLOOD PRESSURE: 92 MMHG

## 2023-01-11 DIAGNOSIS — K08.50 DEFECTIVE DENTAL RESTORATION: Primary | ICD-10-CM

## 2023-01-11 NOTE — PROGRESS NOTES
Composite Filling #2-MOL    Orestes Olmedo presents for composite filling #2-MOL  PMH reviewed, no changes  Pt  Is ASA2  Pain 0/10 but occasionally gets sensitivity between 2 & 3 triggered by food impaction  Pt  Reports trying to use proxy brushes and floss to clean the interproximal area but is struggling to keep it clean and if he uses too much force the provisional pops off  Explained that we need to improve the contact between 2 & 3 in order to improve the health of the interproximal gingiva, which, due to food impaction has receded and a radiograph shows bone loss  1 PA taken of tooth #3  No PAP noted, provisional covers margins of #3  #2 MO contact shows open contact  Discussed with patient need for RCT if pulp exposure occurs or in future if pulp is inflamed  Pt understands and consents  Applied topical benzocaine, administered 2 carps 4% articaine w 1:100k epi via infiltration  Prepped tooth #2-MOL with 245 carbide on high speed  Caries removed with round carbide on slow speed  Removed existing amalgam restoration on 2-OL  Placed Don matrix  Isolation with DryShield, cotton rolls, and dri-angles  Etch with 37% H2PO4, rinse, dry  Applied Adhese with 20 second scrub once, gentle air dry and light cured for 10s  Restored with Tetric bulk jay shade A2 and light cured  Removed provisional on #3 and adjusted contact with A2 flowable composite  Cleaned cement off #3 prep and out of provisional  Confirmed seating, contacts, and margins  Recemented with Provicell temporary cement  Refined with finishing burs, polished with enhance point  Verified occlusion and contacts  Pt left satisfied  Explained to pt  That hopefully this improved contact between 2-3 would improve the sensitivity, food impaction, and receding gingiva  Suggested to pt  To utilize a water pik for the area between 2-3  Pt  Now is requesting to switch txp of #3 to a full cast gold crown   Altered txp to reflect this desire  Recommend using DryShield for isolation on pt  NV: final impression #3 - pt   Wants to do a gold crown

## 2023-01-17 ENCOUNTER — OFFICE VISIT (OUTPATIENT)
Dept: DENTISTRY | Facility: CLINIC | Age: 58
End: 2023-01-17

## 2023-01-17 VITALS — HEART RATE: 64 BPM | DIASTOLIC BLOOD PRESSURE: 87 MMHG | SYSTOLIC BLOOD PRESSURE: 135 MMHG | TEMPERATURE: 97.5 F

## 2023-01-17 DIAGNOSIS — K08.50 DEFECTIVE DENTAL RESTORATION: Primary | ICD-10-CM

## 2023-01-17 NOTE — PROGRESS NOTES
Final Impression #3    Julito Ruth presents to clinic for crown prep refinement and final impression #3  RMH, no changes  Pt  Is ASA2  Pt  Reports no changes and less sensitivity since the new MOL restoration on #2 and restoring the contact on #2-#3  Pt  Says he is getting less food impaction and feels the area is healing  Applied topical benzocaine, administered 1 carp 4% articaine 1:100k epi via local infiltration  Removed provisional crown  Removed excess cement and refined preparation of tooth #3  Packed one 0 cord and used retraction paste with retraction cap  Took final impression with light and heavy body PVS in triple tray  Pt  Requested a solid gold crown  No shade selection needed  Case sent to [facility]      NV: crown delivery #3 w Angelica  NNV: #19 final implant impression w Mac

## 2023-01-23 ENCOUNTER — OFFICE VISIT (OUTPATIENT)
Dept: DENTISTRY | Facility: CLINIC | Age: 58
End: 2023-01-23

## 2023-01-23 VITALS — TEMPERATURE: 97.3 F | SYSTOLIC BLOOD PRESSURE: 154 MMHG | DIASTOLIC BLOOD PRESSURE: 94 MMHG | HEART RATE: 105 BPM

## 2023-01-23 DIAGNOSIS — Z01.20 ENCOUNTER FOR DENTAL EXAMINATION AND CLEANING WITHOUT ABNORMAL FINDINGS: Primary | ICD-10-CM

## 2023-01-23 NOTE — PROGRESS NOTES
Pt presented for emergency apt, temp crown fell off #3  ASA: II  Patient had excess cement/food on the distal of tooth #3 where bone loss if evident  Discussed importance of keeping area clean  Excess cement removed, prepped tooth cleaned  Tooth temporary cemented back on, excess removed  Contacts and occlusion checked  Pt left satisfied       NV: crown delivery #3 with dr María Lora  NVV: implant final impression dr REJI norton with dr Marina Schaefer

## 2023-01-24 ENCOUNTER — OFFICE VISIT (OUTPATIENT)
Dept: DENTISTRY | Facility: CLINIC | Age: 58
End: 2023-01-24

## 2023-01-24 VITALS — DIASTOLIC BLOOD PRESSURE: 90 MMHG | SYSTOLIC BLOOD PRESSURE: 122 MMHG | HEART RATE: 114 BPM | TEMPERATURE: 97.5 F

## 2023-01-24 DIAGNOSIS — K08.89 LOSS OF RETENTION OF DENTAL CROWN: Primary | ICD-10-CM

## 2023-01-24 NOTE — PROGRESS NOTES
Mohit Patel A  Pennie Rack presents to clinic for emergency appointment due to fracture of provisional crown #3  RMH, no changes  Pt  Is ASA2  Pt  Was here yesterday for recementation of provisional which broke within 24 hours of leaving office  Re-explained to pt  That he should avoid eating on this area especially with hard, crunchy, sticky, and gooey foods  Pt  Says he cannot eat on the other side due to missing implant crown on #19 so he needs to eat on #3  Checked provisional that pt  Brought - provisional had broken in half  Pt  Was having sensitivity so offered pt  Anesthesia for comfort  Administered 1 carp 4% articaine with 1:100k epi via infiltration  Pt  Comfort sufficient anesthesia  Used provisa material and matrix to fabricate new provisional     Adjusted contacts and occlusion to pt  Satisfaction  Cemented provisional with durelon + vaseline and cleaned excess  Confirmed pt  Comfort      NV: deliver gold crown #3

## 2023-01-30 ENCOUNTER — OFFICE VISIT (OUTPATIENT)
Dept: DENTISTRY | Facility: CLINIC | Age: 58
End: 2023-01-30

## 2023-01-30 DIAGNOSIS — Z01.20 ENCOUNTER FOR DENTAL EXAMINATION AND CLEANING WITHOUT ABNORMAL FINDINGS: Primary | ICD-10-CM

## 2023-01-30 NOTE — PROGRESS NOTES
Pt presented for emergency temp crown #3 fell off  Pt reported increased sensitivity, to be sent to endo to evaluate for rct #3, referral given to pt  Dr Konstantin Chau previously examined and determined rct is needed  Pt understood  Temp recemented back on with duralon, excess removed  Occlusion checked       NV: crown delivery after endo #3

## 2023-02-17 ENCOUNTER — OFFICE VISIT (OUTPATIENT)
Dept: DENTISTRY | Facility: CLINIC | Age: 58
End: 2023-02-17

## 2023-02-17 VITALS — DIASTOLIC BLOOD PRESSURE: 91 MMHG | TEMPERATURE: 97.4 F | SYSTOLIC BLOOD PRESSURE: 130 MMHG | HEART RATE: 96 BPM

## 2023-02-17 DIAGNOSIS — K08.109 TEETH MISSING: Primary | ICD-10-CM

## 2023-02-17 NOTE — PROGRESS NOTES
Implant Final Impression #19     Pt presents for final impression for implant crown on #19 (re-do)  Pt consents to treatment  PMH reviewed, no changes  Implant at site #19: Yamilka 4 1x10mm      Healing abutment in place removed with   Closed tray impression coping (and screw) placed in implant  PA taken impression coping seated all the way  Opposing upper arch impression taken with Silgimix  Bite registration taken with blue bite  Closed tray technique used  Cotton pellet placed in screw hole  Light body placed around impression coping  Heavy body placed in tray  Impression removed after 3 5 mins  Dr Mamie Segovia helped with impression  Screw taken out of coping and placed back in after impression was removed from the mouth  #19 full contour zirconia crown screw retained shade A1, pt approved  - sent to [facility]      Healing abutment placed back in site #19  Pt understands that he will be scheduled after implant crown comes back from the lab  Pt left ambulatory and satisfied        NV: crown delivery #19 implant crown when back from lab    NV2: crown delivery or new final impression #3 w/ Dr Dumas Rayshawn (after pt gets RCT #3 at outside office)

## 2023-03-06 ENCOUNTER — OFFICE VISIT (OUTPATIENT)
Dept: DENTISTRY | Facility: CLINIC | Age: 58
End: 2023-03-06

## 2023-03-06 VITALS — TEMPERATURE: 97.7 F

## 2023-03-06 DIAGNOSIS — K08.50 DEFECTIVE DENTAL RESTORATION: Primary | ICD-10-CM

## 2023-03-13 ENCOUNTER — OFFICE VISIT (OUTPATIENT)
Dept: DENTISTRY | Facility: CLINIC | Age: 58
End: 2023-03-13

## 2023-03-13 VITALS — DIASTOLIC BLOOD PRESSURE: 97 MMHG | TEMPERATURE: 98.8 F | SYSTOLIC BLOOD PRESSURE: 128 MMHG | HEART RATE: 87 BPM

## 2023-03-13 DIAGNOSIS — Z01.21 ENCOUNTER FOR DENTAL EXAMINATION AND CLEANING WITH ABNORMAL FINDINGS: Primary | ICD-10-CM

## 2023-03-13 RX ORDER — QUETIAPINE FUMARATE 100 MG/1
TABLET, FILM COATED ORAL
COMMUNITY

## 2023-03-13 NOTE — DENTAL PROCEDURE DETAILS
Jv Lino presents for a Periodic exam  Verbal consent for treatment given in addition to the forms  Reviewed health history - Patient is ASA II  Consents signed: Yes       Prophy    Reviewed medical history with patient  ASA - II  Pain - 0/10  Pts CC - Patient is scheduled to have endo on #3 completed at [facility] on 4/13/2023  Once the endo is complete, patient needs to return for crown delivery or final impression with Dr Dominguez  Method Used:  Prophy Method Used: Ultrasonic Scaling  Hand Scaling  Polished  Flossed    Radiographs Taken:  None    Intra/Extra Oral Cancer Screening:  Within normal limits      Oral Hygiene:  Fair    Plaque:  Generalized  Moderate    Calculus:  Localized  Lower anteriors    Bleeding:  Bleeding on probing: No periodontal exam for this visit  Localized  Moderate    Stain:  Localized  Light    Periodontal Charting:  Spot probing    Periodontal Classification:  Localized  Moderate  Periodontal Disease      Nutritional Counseling:  N/A    OHI: Disp and demonstrated how to use the rubber tip stimulator around sore gums following prepped teeth  Periodic Exam:Dr Layton:  -OCS - No findings  -No new decay noted    NV-[place] delivery #19  NV2-[place] delivery or new final impression #3 with Dr Dominguez after patient gets RCT#3 completed at outside office  Patient is scheduled at [facility] for Endo on 4/13/2023  NV3-6 Mths prophy with periodic exam,perio charting and 4 BWX

## 2023-03-23 NOTE — PROGRESS NOTES
Limited Oral Evaluation    Julito Flores presented to [facility] for limited oral evaluation  PMH reviewed, no changes  CC "my temporary crown is loose "    Crown #3 removed and cleaned  Prep cleaned  Provisional crown #3 re-cemented with Duralon + vasaline  Excess cement removed  Pt has a root canal scheduled at [address] in April  Pt left ambulatory and satisfied  NV: #3 crown delivery or new final impression w/ Dr Matt Mayorga after pt has #3 endo completed

## 2023-03-27 ENCOUNTER — OFFICE VISIT (OUTPATIENT)
Dept: DENTISTRY | Facility: CLINIC | Age: 58
End: 2023-03-27

## 2023-03-27 VITALS — HEART RATE: 94 BPM | DIASTOLIC BLOOD PRESSURE: 90 MMHG | SYSTOLIC BLOOD PRESSURE: 137 MMHG

## 2023-03-27 DIAGNOSIS — Z98.811 DENTAL CROWNS STATUS: Primary | ICD-10-CM

## 2023-03-27 PROBLEM — R63.8 INCREASED BODY MASS INDEX: Status: ACTIVE | Noted: 2022-07-12

## 2023-03-27 PROBLEM — N40.0 ENLARGED PROSTATE: Status: ACTIVE | Noted: 2018-12-12

## 2023-03-27 PROBLEM — M54.16 RADICULOPATHY, LUMBAR REGION: Status: ACTIVE | Noted: 2021-12-17

## 2023-03-27 PROBLEM — M79.673 FOOT PAIN: Status: ACTIVE | Noted: 2021-10-07

## 2023-03-27 PROBLEM — H25.13 AGE-RELATED NUCLEAR CATARACT OF BOTH EYES: Status: ACTIVE | Noted: 2019-05-17

## 2023-03-27 PROBLEM — M54.40 LUMBAGO WITH SCIATICA: Status: ACTIVE | Noted: 2022-07-12

## 2023-03-27 PROBLEM — M54.12 CERVICAL RADICULOPATHY: Status: ACTIVE | Noted: 2023-03-27

## 2023-03-27 PROBLEM — M79.642 PAIN OF LEFT HAND: Status: ACTIVE | Noted: 2023-03-27

## 2023-03-27 PROBLEM — M25.559 HIP PAIN: Status: ACTIVE | Noted: 2023-03-27

## 2023-03-27 PROBLEM — M94.262 CHONDROMALACIA OF LEFT KNEE: Status: ACTIVE | Noted: 2021-06-16

## 2023-03-27 PROBLEM — M25.511 PAIN IN JOINT OF RIGHT SHOULDER: Status: ACTIVE | Noted: 2022-08-25

## 2023-03-27 PROBLEM — S46.119A RUPTURE OF TENDON OF BICEPS, LONG HEAD: Status: ACTIVE | Noted: 2022-07-12

## 2023-03-27 PROBLEM — L21.9 SEBORRHEIC DERMATITIS: Status: ACTIVE | Noted: 2021-09-01

## 2023-03-27 PROBLEM — R91.1 PULMONARY NODULE: Status: ACTIVE | Noted: 2022-09-02

## 2023-03-27 RX ORDER — DILTIAZEM HYDROCHLORIDE 120 MG/1
TABLET, FILM COATED ORAL
COMMUNITY

## 2023-03-27 RX ORDER — TADALAFIL 5 MG/1
5 TABLET ORAL
COMMUNITY

## 2023-03-27 RX ORDER — NAPROXEN SODIUM 220 MG
TABLET ORAL
COMMUNITY

## 2023-03-27 NOTE — PROGRESS NOTES
Pt presented with temp crown that fell off #3  No pain    Duralon scaled off tooth with  and rinsed  Tried crown back on, good fit  Crown cemented on with duralon, excess removed  Occlusion checked  Pt left satisfied  Informed pt he still needs to get the rct for tooth #3 before we can cement on the  permanent crown  Pt understands       NV: crown insertion

## 2023-05-10 ENCOUNTER — OFFICE VISIT (OUTPATIENT)
Dept: DENTISTRY | Facility: CLINIC | Age: 58
End: 2023-05-10

## 2023-05-10 VITALS — TEMPERATURE: 97.5 F | HEART RATE: 93 BPM | DIASTOLIC BLOOD PRESSURE: 90 MMHG | SYSTOLIC BLOOD PRESSURE: 126 MMHG

## 2023-05-10 DIAGNOSIS — M27.63 FRACTURE OF DENTAL IMPLANT: ICD-10-CM

## 2023-05-10 DIAGNOSIS — K08.409 TOOTH MISSING: Primary | ICD-10-CM

## 2023-05-10 DIAGNOSIS — Z98.890 HISTORY OF ROOT CANAL PROCEDURE: ICD-10-CM

## 2023-05-10 DIAGNOSIS — K08.539: ICD-10-CM

## 2023-05-10 NOTE — PROGRESS NOTES
Delivery of Implant [place] #19    Pt presents for delivery of screw retained implant crown #19  PMH reviewed, no changes  Removed healing abutment and peridex rinse  Crown seated and hand torqued  Obtained PA and BW radiograph, confirmed crown fully seated  Occlusion checked and ideal occlusal markings present  Verified contacts are tight and snap floss  Final torque of 35 Ncm  Pt stated it felt tight - explained that his gingiva will need to contour to new implant as he only had abutment present  Pt stated bite felt normal, but he will need to get used to having a tooth there now  Screw access covered with sterilized Rigo tape and access channel covered with A3 composite  Light cured  Verified occlusion  Pt stated he went to [place] [id number] for his RCT on UR molar  PA radiograph of #3 taken  RCT completed w/ optimal fill  Temporary filling in access w/ possible cotton pellet  Discussed w/ pt that this temporary filling needs to be removed and replaced w/ build up  Pt understood  Removed temporary crown  Cleaned temporary cement off  Removed temporary filling material  Cleaned access  Applied Multicore adhesive with 20 second scrub once, gentle air dry and light cured for 10s  Applied multicore to access - did not fill completely, so not in hyper occlusion/so gold crown fit  Light cured  Recemented temporary crown with Provisa Temporary cement  Removed excess cement  Pt satisfied with function and esthetics, left ambulatory and satisfied       NV: Delivery of #3 gold crown w/ Dr Jenna Davis

## 2023-05-11 ENCOUNTER — OFFICE VISIT (OUTPATIENT)
Dept: DENTISTRY | Facility: CLINIC | Age: 58
End: 2023-05-11

## 2023-05-11 VITALS — SYSTOLIC BLOOD PRESSURE: 118 MMHG | DIASTOLIC BLOOD PRESSURE: 75 MMHG | HEART RATE: 94 BPM | TEMPERATURE: 97.8 F

## 2023-05-11 DIAGNOSIS — T85.848A DENTAL IMPLANT PAIN, INITIAL ENCOUNTER: Primary | ICD-10-CM

## 2023-05-11 NOTE — PROGRESS NOTES
"   Limited Oral Evaluation    Julito Langley presented to [facility] for limited oral evaluation for \"my implant hurts and feels tight  \" PMH reviewed, no changes  Pt states he has headaches and is taking CBD for the headaches  Pt had screw retained implant #19 delivered yesterday  Implant contacts are WNL  Occlusion WNL  Gingiva is WNL and healthy  Pt pointed to DB cusp of implant/in bewteen #18 and #19  Floss had normal contact in between #18 and #19 and no food impacted  Informed pt I can do a slight adjustment, but the tooth has anatomy to it (cusps, etc)  Pt agreed to adjustment  Adjusted DB cusp of implant  Pt confirmed it felt smoother and felt better  Informed pt it will take time to get used to implant as it is a prosthesis and he has not had a tooth there for a long time  Informed pt that gingiva needs to conform to implant as he only had a small abutment previously  Advised warm salt water rinses and taking ibuprofen for pain  Pt understood  Pt left ambulatory and satisfied       NV: #3 gold crown delivery w/ Dr Breanne Juarez  "

## 2023-06-05 ENCOUNTER — OFFICE VISIT (OUTPATIENT)
Dept: DENTISTRY | Facility: CLINIC | Age: 58
End: 2023-06-05

## 2023-06-05 DIAGNOSIS — Z98.811 DENTAL CROWNS STATUS: Primary | ICD-10-CM

## 2023-06-05 PROCEDURE — D2790: HCPCS

## 2023-06-05 NOTE — PROGRESS NOTES
[place] Delivery #3    Pt presents for delivery of full cast metal crown crown  #3  PMH reviewed, no changes  Pt reports no pain or sensitivity from tooth since last visit  ASA 2, pain 0  Pt opted for no anesthesia during procedure  Provisional crown removed, cement removed from prep with   Crown tried on die, confirmed accurate seating, margin will adapted and sealed  [place] tried intraorally, crown contacts were tight, adjusted it with high speed hand piece  Crown seating fully with floss snapping through contacts  Verified seating with radiograph  No margin in the middle of the distal so radiograph looks open but clinically margin is closed  Occlusion verified and adjusted with finishing burs and polished with porcelain polishing disc  Pt confirmed satisfaction  Rinse of prep and air dry, isolated with cotton rolls  Cemented with prerna gi cement, excess cement removed  After 3 min, floss through contacts to remove interproximal cement  Remaining cement removal after 5 minute final set  Occlusion and contacts verified  Pt thinks the bite is comfortable he is not used to having a tooth there, will call in if adjustments are needed to occlusion, left satisfied and ambulatory      NV:  Recall

## 2023-09-25 ENCOUNTER — OFFICE VISIT (OUTPATIENT)
Dept: DENTISTRY | Facility: CLINIC | Age: 58
End: 2023-09-25

## 2023-09-25 VITALS — SYSTOLIC BLOOD PRESSURE: 115 MMHG | HEART RATE: 62 BPM | DIASTOLIC BLOOD PRESSURE: 83 MMHG

## 2023-09-25 DIAGNOSIS — Z01.21 ENCOUNTER FOR DENTAL EXAMINATION AND CLEANING WITH ABNORMAL FINDINGS: Primary | ICD-10-CM

## 2023-09-25 PROCEDURE — D0274 BITEWINGS - 4 RADIOGRAPHIC IMAGES: HCPCS

## 2023-09-25 PROCEDURE — D0120 PERIODIC ORAL EVALUATION - ESTABLISHED PATIENT: HCPCS

## 2023-09-25 PROCEDURE — D1110 PROPHYLAXIS - ADULT: HCPCS

## 2023-09-25 RX ORDER — COVID-19 ANTIGEN TEST
1 KIT MISCELLANEOUS DAILY
COMMUNITY

## 2023-09-25 RX ORDER — COVID-19 ANTIGEN TEST
KIT MISCELLANEOUS
COMMUNITY

## 2023-09-25 NOTE — DENTAL PROCEDURE DETAILS
Prophylaxis completed with ultrasonic  and hand instrumentation. Soft plaque removed and supragingival calculus removed from all quads. Polished with prophy cup and paste. Flossed and provided Oral Health Instructions. Demonstrated proper brushing and flossing technique. Patient left satisfied and ambulatory. PERIODIC EXAM, ADULT PROPHY AND 4 BWX     REVIEWED MED HX: meds, allergies, health changes reviewed in EPIC  CHIEF CONCERN:  none   PAIN SCALE:  0  ASA CLASS:  II  PLAQUE:   moderate  - F and L  CALCULUS:     light   BLEEDING:  light     STAIN :   moderate - Ant F and L  ORAL HYGIENE: fair    PERIO: stable    Hand scaled, polished and flossed. Used Cavitron. Oral Hygiene Instruction:  recommended brushing 2 x daily for 2 minutes MIN, recommended flossing daily, reviewed dietary precautions. Visual and Tactile Intraoral/ Extraoral evaluation: Oral and Oropharyngeal cancer evaluation. No findings     Dr. Lauri Meyers exam=   Reviewed with patient clinical and radiographic findings and patient verbalized understanding. All questions and concerns addressed.      REFERRALS: no referrals needed    CARIES FINDINGS:   Nothing noted  Watch #2-O       TREATMENT  PLAN :   1) Return for 6 MTHS Prophy    Next Recall: 6 month recall with periodic exam    Last BWX: 9/25/2023  Last FMX :  4/15/2021

## 2023-09-26 ENCOUNTER — OFFICE VISIT (OUTPATIENT)
Dept: DENTISTRY | Facility: CLINIC | Age: 58
End: 2023-09-26

## 2023-09-26 VITALS — DIASTOLIC BLOOD PRESSURE: 82 MMHG | SYSTOLIC BLOOD PRESSURE: 122 MMHG | HEART RATE: 106 BPM

## 2023-09-26 DIAGNOSIS — K08.50 DEFECTIVE DENTAL RESTORATION: Primary | ICD-10-CM

## 2023-09-26 PROCEDURE — D0140 LIMITED ORAL EVALUATION - PROBLEM FOCUSED: HCPCS

## 2023-12-22 ENCOUNTER — HOSPITAL ENCOUNTER (EMERGENCY)
Facility: HOSPITAL | Age: 58
Discharge: HOME | End: 2023-12-22
Attending: EMERGENCY MEDICINE
Payer: COMMERCIAL

## 2023-12-22 ENCOUNTER — APPOINTMENT (EMERGENCY)
Dept: RADIOLOGY | Facility: HOSPITAL | Age: 58
End: 2023-12-22
Payer: COMMERCIAL

## 2023-12-22 VITALS
SYSTOLIC BLOOD PRESSURE: 131 MMHG | DIASTOLIC BLOOD PRESSURE: 81 MMHG | HEART RATE: 119 BPM | TEMPERATURE: 98.9 F | OXYGEN SATURATION: 95 % | RESPIRATION RATE: 18 BRPM

## 2023-12-22 DIAGNOSIS — M25.559 HIP PAIN: ICD-10-CM

## 2023-12-22 DIAGNOSIS — M25.50 ARTHRALGIA: ICD-10-CM

## 2023-12-22 DIAGNOSIS — M25.579 ANKLE PAIN: ICD-10-CM

## 2023-12-22 DIAGNOSIS — R07.81 RIB PAIN: ICD-10-CM

## 2023-12-22 DIAGNOSIS — M54.9 BACK PAIN: ICD-10-CM

## 2023-12-22 DIAGNOSIS — S16.1XXA STRAIN OF NECK MUSCLE, INITIAL ENCOUNTER: Primary | ICD-10-CM

## 2023-12-22 PROCEDURE — 72125 CT NECK SPINE W/O DYE: CPT

## 2023-12-22 PROCEDURE — 73501 X-RAY EXAM HIP UNI 1 VIEW: CPT

## 2023-12-22 PROCEDURE — 73610 X-RAY EXAM OF ANKLE: CPT

## 2023-12-22 PROCEDURE — 71045 X-RAY EXAM CHEST 1 VIEW: CPT

## 2023-12-22 PROCEDURE — G1004 CDSM NDSC: HCPCS

## 2023-12-22 PROCEDURE — 99285 EMERGENCY DEPT VISIT HI MDM: CPT | Performed by: EMERGENCY MEDICINE

## 2023-12-22 PROCEDURE — 73030 X-RAY EXAM OF SHOULDER: CPT

## 2023-12-22 RX ORDER — CYCLOBENZAPRINE HCL 10 MG
10 TABLET ORAL 3 TIMES DAILY
Qty: 9 TABLET | Refills: 0 | Status: SHIPPED | OUTPATIENT
Start: 2023-12-22 | End: 2023-12-25
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
## 2023-12-22 NOTE — ED NOTES
This RN attempted to place C-collar but pt refused. Pt educated on use but pt requested it to be removed.      Luna Garrett RN  12/22/23 7432

## 2024-02-29 ENCOUNTER — OFFICE VISIT (OUTPATIENT)
Dept: DENTISTRY | Facility: CLINIC | Age: 59
End: 2024-02-29

## 2024-02-29 VITALS — SYSTOLIC BLOOD PRESSURE: 142 MMHG | DIASTOLIC BLOOD PRESSURE: 96 MMHG | HEART RATE: 93 BPM

## 2024-02-29 DIAGNOSIS — K04.7 DENTAL ABSCESS: Primary | ICD-10-CM

## 2024-02-29 PROCEDURE — D0140 LIMITED ORAL EVALUATION - PROBLEM FOCUSED: HCPCS | Performed by: DENTIST

## 2024-02-29 PROCEDURE — D0220 INTRAORAL - PERIAPICAL FIRST RADIOGRAPHIC IMAGE: HCPCS | Performed by: DENTIST

## 2024-02-29 RX ORDER — AMOXICILLIN 500 MG/1
500 CAPSULE ORAL EVERY 8 HOURS SCHEDULED
Qty: 21 CAPSULE | Refills: 0 | Status: SHIPPED | OUTPATIENT
Start: 2024-02-29 | End: 2024-03-07

## 2024-02-29 NOTE — PROGRESS NOTES
Limited Exam / TXP     Julito Mac , 59 y.o. presents for limited exam/ TXP. Reviewed PMH, no changes. ASA 1,  pain 6 on tooth #3.    Caries Assessment: medium  PA #3  RCT on #3 is failing   Patient had it completed at [facility].  Referral given back and pt placed on Antibiotics.  MQ

## 2024-05-07 ENCOUNTER — OFFICE VISIT (OUTPATIENT)
Dept: DENTISTRY | Facility: CLINIC | Age: 59
End: 2024-05-07

## 2024-05-07 VITALS — HEART RATE: 105 BPM | DIASTOLIC BLOOD PRESSURE: 86 MMHG | SYSTOLIC BLOOD PRESSURE: 122 MMHG

## 2024-05-07 DIAGNOSIS — Z01.21 ENCOUNTER FOR DENTAL EXAMINATION AND CLEANING WITH ABNORMAL FINDINGS: Primary | ICD-10-CM

## 2024-05-07 PROCEDURE — D1110 PROPHYLAXIS - ADULT: HCPCS

## 2024-05-07 PROCEDURE — D0120 PERIODIC ORAL EVALUATION - ESTABLISHED PATIENT: HCPCS

## 2024-05-07 NOTE — DENTAL PROCEDURE DETAILS
Prophylaxis completed with ultrasonic  and hand instrumentation.  Soft plaque removed and supragingival calculus removed from all quads   Polished with prophy cup and paste.  Flossed and provided Oral Health Instructions.  Demonstrated proper brushing and flossing technique.  Patient left satisfied and ambulatory.         PERIODIC EXAM, ADULT PROPHY , (no xrays due )   REVIEWED MED HX: meds, allergies, health changes reviewed in [facility]. All consents signed.  CHIEF CONCERN: Patient was referred to [facility] to have endo #3 re evaluated.  The endo was completed at [facility] but patient is having a difficult time reaching the dental department. Hygienist provided patient with our referral specialist information.  PAIN SCALE:  0  ASA CLASS:  II  PLAQUE:  heavy  CALCULUS:  moderate  BLEEDING:   light  STAIN :   moderate      PERIO: Need full perio charting to be updated when time allows    Hygiene Procedures:  Scaled, Polished, Flossed and Used Cavitron    Oral Hygiene Instruction: Brushing Minimum 2x daily for 2 minutes, daily flossing and Interproximal Brush    Dispensed: Toothbrush, Toothpaste, Floss, and Flossers    Visual and Tactile Intraoral/ Extraoral evaluation: Oral and Oropharyngeal cancer evaluation. No findings     Dr. Vital   Reviewed with patient clinical and radiographic findings and patient verbalized understanding. All questions and concerns addressed.     REFERRALS: Previous endo referral to [facility] for #3 re eval    CARIES FINDINGS:   #2-L       TREATMENT  PLAN :   NV: #2-L resin   NV2:20-B resin    Next Recall: 6 month recall with periodic exam and 4 BWX    Last BWX: 9/25/2023  Last  FMX : 4/15/2021

## 2024-06-20 ENCOUNTER — OFFICE VISIT (OUTPATIENT)
Dept: DENTISTRY | Facility: CLINIC | Age: 59
End: 2024-06-20

## 2024-06-20 VITALS — HEART RATE: 92 BPM | DIASTOLIC BLOOD PRESSURE: 86 MMHG | SYSTOLIC BLOOD PRESSURE: 131 MMHG

## 2024-06-20 DIAGNOSIS — K02.9 TOOTH DECAY: Primary | ICD-10-CM

## 2024-06-20 PROCEDURE — D2391 RESIN-BASED COMPOSITE - 1 SURFACE, POSTERIOR: HCPCS

## 2024-06-20 NOTE — DENTAL PROCEDURE DETAILS
Patient presents for a dental restoration and verbally consents for treatment:  Reviewed health history-  Pt is ASA type II  Treatment consents signed: Yes  Perio: Healthy  Pain Scale: 0  Caries Assessment: Medium    Radiographs: Films are current  Oral Hygiene instruction reviewed and given  Hygiene recall visits recommended to the patient    Discussed with patient need for RCT if pulp exposure occurs or in future if pulp is inflamed. They understand and consent. Applied topical benzocaine, administered 0.5 carps 2% lido 1:100k epi via buccal infiltration of #2. Prepped tooth #2 and 20 with 245 carbide on high speed. Isolation with cotton rolls and dri-angles.   #2: Etch with 37% H2PO4, rinse, dry. Applied Adhese with 20 second scrub once, gentle air dry and light cured for 10s. Restored with Tetric bulk jay shade A3 and light cured.  #20: Etch with 37% H2PO4, rinse, dry. Applied Adhese with 20 second scrub once, gentle air dry and light cured for 10s. Restored with Tetric bulk flowable A3 and light cured.    Refined with finishing burs, polished with enhance point. Verified occlusion and contacts. POI given. Emphasized importance of not eating on that side until numbing wears off to avoid post-anesthesia injury, like lip/cheek biting.    Pt left satisfied and ambulatory.    Prognosis is Good.   Referrals Needed: No    Next visit:  periodic / prophy / f/up after endo consult

## 2024-11-14 ENCOUNTER — OFFICE VISIT (OUTPATIENT)
Dept: DENTISTRY | Facility: CLINIC | Age: 59
End: 2024-11-14

## 2024-11-14 VITALS — HEART RATE: 107 BPM | DIASTOLIC BLOOD PRESSURE: 80 MMHG | SYSTOLIC BLOOD PRESSURE: 132 MMHG

## 2024-11-14 DIAGNOSIS — Z01.21 ENCOUNTER FOR DENTAL EXAMINATION AND CLEANING WITH ABNORMAL FINDINGS: Primary | ICD-10-CM

## 2024-11-14 PROCEDURE — D0120 PERIODIC ORAL EVALUATION - ESTABLISHED PATIENT: HCPCS

## 2024-11-14 PROCEDURE — D1110 PROPHYLAXIS - ADULT: HCPCS

## 2024-11-14 PROCEDURE — D0274 BITEWINGS - 4 RADIOGRAPHIC IMAGES: HCPCS

## 2024-11-14 NOTE — PROGRESS NOTES
PERIODIC EXAM, ADULT PROPHY , 4 BWX   REVIEWED MED HX: meds, allergies, health changes reviewed in [facility]. All consents signed.  CHIEF CONCERN: no dental pain or concerns  PAIN SCALE:  0  ASA CLASS:  II  PLAQUE:  moderate  CALCULUS:  light  BLEEDING:   light  STAIN :   light      PERIO: 2C Max posteriors 4-5 mm due to  sub ging restorations    Hygiene Procedures:  Scaled, Polished, Flossed and Used Cavitron    Oral Hygiene Instruction: Brushing Minimum 2x daily for 2 minutes, daily flossing, Interproximal Brush, and Electric toothbrush    Dispensed: Toothbrush, Toothpaste, Floss    Visual and Tactile Intraoral/ Extraoral evaluation: Oral and Oropharyngeal cancer evaluation. No findings     Dr. Grisel Reece   Reviewed with patient clinical and radiographic findings and patient verbalized understanding. All questions and concerns addressed.     REFERRALS: none    CARIES FINDINGS: Nothing noted       TREATMENT  PLAN :   NV: 6 MRC    Next Recall: 6 month recall with periodic exam    Last  FMX : 4/15/2021

## 2024-12-16 ENCOUNTER — OFFICE VISIT (OUTPATIENT)
Dept: DENTISTRY | Facility: CLINIC | Age: 59
End: 2024-12-16

## 2024-12-16 VITALS — DIASTOLIC BLOOD PRESSURE: 83 MMHG | HEART RATE: 78 BPM | SYSTOLIC BLOOD PRESSURE: 128 MMHG

## 2024-12-16 DIAGNOSIS — Z97.2: Primary | ICD-10-CM

## 2024-12-16 PROCEDURE — D0140 LIMITED ORAL EVALUATION - PROBLEM FOCUSED: HCPCS

## 2024-12-16 PROCEDURE — D0220 INTRAORAL - PERIAPICAL FIRST RADIOGRAPHIC IMAGE: HCPCS

## 2024-12-16 NOTE — PROGRESS NOTES
"Procedure Details  19  - INTRAORAL - PERIAPICAL FIRST RADIOGRAPHIC IMAGE   - LIMITED ORAL EVALUATION - PROBLEM FOCUSED    Limited Exam    Julito Mac 59 y.o. male presents with self to [facility] for Limited exam  PMH reviewed, no changes, ASA II. Significant medical history: NSF. Significant allergies: reviewed. Significant medications:NSF.    Chief complaint:  \" My implant crown is loose again\"    Consent:  Discussed that limited exam focuses on problem area, and same day tx is not guaranteed.  Patient explained to if they wish to have anything else evaluated, they need to return to the practice at which they are a patient of record or schedule a comprehensive exam afterwards.  Patient understands and consent was given by self via verbal consent.    Subjective history:    Onset: a few days ago.   Provocation: Chewing.   Quality: pressure   Region: LL.   Severity: 3/10.   Timing: only when provoked.    Objective clinical findings:  Intraoral exam:  existing restorations/ prosthetic work, moderate incisal and posterior cusp wear , etc     Radiographs: [facility] PA(s) - #19 .       Assessment:  - Bone around #19 implant appears osseointegrated (no signs of implant failure). 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 Informed him that if he chooses we can make him a new one as he will continue to run into the same issue of having a loose crown if he does not wear it.     Plan:   Check in with patient if he needs a new nightguard made (would like to look for it before he pays for a new one)    Referral(s): None needed.  Rx: None.    Patient dismissed ambulatory and alert.    NV: recall 5/2025    Attending: Dr. Page examined pt.   "

## 2025-05-08 ENCOUNTER — OFFICE VISIT (OUTPATIENT)
Dept: OBGYN CLINIC | Facility: CLINIC | Age: 60
End: 2025-05-08
Payer: MEDICARE

## 2025-05-08 VITALS — HEIGHT: 67 IN | WEIGHT: 180 LBS | BODY MASS INDEX: 28.25 KG/M2

## 2025-05-08 DIAGNOSIS — Z01.812 PRE-PROCEDURE LAB EXAM: ICD-10-CM

## 2025-05-08 DIAGNOSIS — G56.21 CUBITAL TUNNEL SYNDROME ON RIGHT: ICD-10-CM

## 2025-05-08 DIAGNOSIS — M72.0 DUPUYTREN DISEASE: ICD-10-CM

## 2025-05-08 DIAGNOSIS — G56.01 CARPAL TUNNEL SYNDROME ON RIGHT: ICD-10-CM

## 2025-05-08 DIAGNOSIS — M65.332 TRIGGER FINGER, LEFT MIDDLE FINGER: Primary | ICD-10-CM

## 2025-05-08 PROCEDURE — 20550 NJX 1 TENDON SHEATH/LIGAMENT: CPT | Performed by: SURGERY

## 2025-05-08 PROCEDURE — 99205 OFFICE O/P NEW HI 60 MIN: CPT | Performed by: SURGERY

## 2025-05-08 RX ORDER — SODIUM CHLORIDE 9 MG/ML
75 INJECTION, SOLUTION INTRAVENOUS CONTINUOUS
OUTPATIENT
Start: 2025-05-08

## 2025-05-08 RX ORDER — CHLORHEXIDINE GLUCONATE ORAL RINSE 1.2 MG/ML
15 SOLUTION DENTAL ONCE
OUTPATIENT
Start: 2025-05-08 | End: 2025-05-08

## 2025-05-08 RX ORDER — DEXAMETHASONE SODIUM PHOSPHATE 10 MG/ML
40 INJECTION, SOLUTION INTRAMUSCULAR; INTRAVENOUS
Status: COMPLETED | OUTPATIENT
Start: 2025-05-08 | End: 2025-05-08

## 2025-05-08 RX ORDER — LIDOCAINE HYDROCHLORIDE 5 MG/ML
1 INJECTION, SOLUTION INFILTRATION; PERINEURAL
Status: COMPLETED | OUTPATIENT
Start: 2025-05-08 | End: 2025-05-08

## 2025-05-08 RX ADMIN — DEXAMETHASONE SODIUM PHOSPHATE 40 MG: 10 INJECTION, SOLUTION INTRAMUSCULAR; INTRAVENOUS at 08:45

## 2025-05-08 RX ADMIN — LIDOCAINE HYDROCHLORIDE 1 ML: 5 INJECTION, SOLUTION INFILTRATION; PERINEURAL at 08:45

## 2025-05-08 NOTE — PROGRESS NOTES
Praveen Du M.D.  Attending, Orthopaedic Surgery  Hand, Wrist, and Elbow Surgery  [facility]      ORTHOPAEDIC HAND, WRIST, AND ELBOW OFFICE  VISIT       ASSESSMENT/PLAN:    Assessment & Plan  Trigger finger, left middle finger  Corticosteroid injection provided today       Cubital tunnel syndrome on right    Orders:    Case request operating room: Right cubital tunnel release with possible anterior ulnar nerve transposition with pedicle based adipose flap, Right carpal tunnel release; Standing    Basic metabolic panel; Future    EKG 12 lead; Future  Patient with mild cubital tunnel on the right some nerve conduction study.  Discussed cubital tunnel release at the elbow with possible anterior transposition with pedicle based adipose flap,  Carpal tunnel syndrome on right    Orders:    Case request operating room: Right cubital tunnel release with possible anterior ulnar nerve transposition with pedicle based adipose flap, Right carpal tunnel release; Standing    Basic metabolic panel; Future    EKG 12 lead; Future  Patient has severe right carpal tunnel based on clinical exam with thenar atrophy as well as nerve conduction studies which demonstrate acute on chronic severe carpal tunnel.  Patient inquired as to delaying surgery for approximately a decade we discussed that likely his carpal tunnel would progress to a point where surgery would not be as beneficial.  Benefits and alternatives to surgical intervention discussed in detail      Pre-procedure lab exam    Orders:    Basic metabolic panel; Future    EKG 12 lead; Future    Dupuytren disease  Has Dupuytren's nodules and early cords on the right side  Gopi etiology and natural history of Dupuytren's disease  Anticipate worsening of the condition with any upper extremity surgery  Discussed treatment options in the future if significant contracture occurs         Trigger Finger: The anatomy and physiology of trigger finger was discussed  with the patient today in the office.  Edema and increased contact pressure within the flexor tendons at the A1 pulley can cause pain, crepitation, and triggering or locking of the digit resulting in limitation of function.  Symptoms can occur at anytime but are typically worse in the morning or after a brief rest from repetitive activity.  Treatment options include resting/nighttime MP blocking splints to decrease edema, oral anti-inflammatory medications, home or formal therapy exercises, up to 2 steroid injections within the tendon sheath, or surgical release.  While majority of patients do respond to conservative treatment, up to 20% may require surgical release.     Dupuytren's Disease:  The anatomy and physiology of Dupuytren's disease were discussed with the patient today in the office.  Increased collagen formation (similar to scar tissue formation but without injury) within the interval between the skin volarly and the flexor tendons dorsally can result in pit formation, nodular formation, or eventual cord formation.  These pathologic cords can cause contracture at either the metacarpophalangeal joint, proximal interphalangeal joint, or both.  As the cords progress towards the proximal interphalangeal joint, the neurovascular structures of the finger may become involved within the disease process.  While this is a genetic condition with variable penetrance, repetitive micro trauma may trigger the development of cord formation and thus contracture.  Conservative treatment options including therapy to maintain joint mobility and tabletop testing were discussed.  Other treatments include Xiaflex (collagenase) injection and surgical excision of abnormal cords.     Carpal Tunnel Syndrome: The anatomy and physiology of carpal tunnel syndrome was discussed with the patient today.  Increase pressure localized under the transverse carpal ligament can cause pain, numbness, tingling, or dysesthesias within the median  nerve distribution as well as feelings of fatigue, clumsiness, or awkwardness.  These symptoms typically occur at night and worse in the morning upon waking.  Eventually, untreated carpal tunnel syndrome can result in weakness and permanent loss of muscle within the thenar compartment of the hand.  Treatment options were discussed with the patient.  Conservative treatment includes nocturnal resting splints to keep the nerve in a neutral position, ergonomic changes within the work or home environment, activity modification, and tendon gliding exercises. Vitamin B6 one tablet daily over the counter may helpful to reduce symptoms.   Steroid injections within the carpal canal can help a majority of patients, however this is often self-limited in a majority of patients.  Surgical intervention to divide the transverse carpal ligament typically results in a long-lasting relief of the patient's complaints, with the recurrence rate of less than 1%.                                                                                                                                                                                   The patient verbalized understanding of exam findings and treatment plan. We engaged in the shared decision-making process and treatment options were discussed at length with the patient. Surgical and conservative management discussed today along with risks and benefits. The patient elected to receive a trigger finger injection into the left long finger and discuss surgical intervention, in the form of right cubital and carpal tunnel release, for his right hand symptoms.     Diagnoses and all orders for this visit:    Trigger finger, left middle finger    Cubital tunnel syndrome on right  -     Case request operating room: Right cubital tunnel release with possible anterior ulnar nerve transposition with pedicle based adipose flap, Right carpal tunnel release; Standing  -     Basic metabolic panel;  Future  -     EKG 12 lead; Future  -     Case request operating room: Right cubital tunnel release with possible anterior ulnar nerve transposition with pedicle based adipose flap, Right carpal tunnel release    Carpal tunnel syndrome on right  -     Case request operating room: Right cubital tunnel release with possible anterior ulnar nerve transposition with pedicle based adipose flap, Right carpal tunnel release; Standing  -     Basic metabolic panel; Future  -     EKG 12 lead; Future  -     Case request operating room: Right cubital tunnel release with possible anterior ulnar nerve transposition with pedicle based adipose flap, Right carpal tunnel release    Pre-procedure lab exam  -     Basic metabolic panel; Future  -     EKG 12 lead; Future    Other orders  -     Hand/upper extremity injection: L long A1  -     Nursing Communication Warmimg Interventions Implemented; Standing  -     Nursing Communication CHG bath, have staff wash entire body (neck down) per pre-op bathing protocol. Routine, evening prior to, and day of surgery.; Standing  -     Nursing Communication Swab both nares with Povidone-Iodine solution, EXCLUDE if patient has shellfish/Iodine allergy, and replace with nasal alcohol swabstick. Routine, day of surgery, on call to OR; Standing  -     chlorhexidine (PERIDEX) 0.12 % oral rinse 15 mL  -     Void on call to OR; Standing  -     Insert peripheral IV; Standing  -     Diet NPO; Sips with meds; Standing  -     sodium chloride 0.9 % infusion  -     ceFAZolin (ANCEF) 2,000 mg in sodium chloride 0.9 % 50 mL IVPB  -     Apply Sequential Compression Device; Standing        Follow Up:  No follow-ups on file.    To Do Next Visit:  Re-evaluation of current issue      ____________________________________________________________________________________________________________________________________________      CHIEF COMPLAINT:  Chief Complaint   Patient presents with    Left Hand - Locking, Triggering      Left middle finger       SUBJECTIVE:  Julito Mac is a 60 y.o. year old RHD male who presents for initial evaluation of his left long finger. He complains of pain at the volar aspect of the wrist, which radiates into the long finger of the left hand. Endorses catching, which occasionally requires manual release. Endorses numbness/tingling into the left long finger, which is most prominent when the finger catches.     Additionally, he mentions that he experiences numbness and tingling into the right hand, most prominently affecting the second through fourth digits. He has previously had an EMG done on 3/21/2025.      Pain/symptom timing:  Worse during the day when active  Pain/symptom context:  Worse with activites and work  Pain/symptom modifying factors:  Rest makes better, activities make worse  Pain/symptom associated signs/symptoms: numbness/tingling     Prior treatment   NSAIDs No   Injections No   Bracing/Orthotics No    Physical Therapy No     I have personally reviewed all the relevant PMH, PSH, SH, FH, Medications and allergies      PAST MEDICAL HISTORY:  Past Medical History:   Diagnosis Date    Anxiety     Chronic pain disorder     Colon polyp     GERD (gastroesophageal reflux disease)     Hypertension     PTSD (post-traumatic stress disorder)        PAST SURGICAL HISTORY:  Past Surgical History:   Procedure Laterality Date    ANKLE SURGERY      CERVICAL SPINE SURGERY      CHOLECYSTECTOMY      COLONOSCOPY      ELBOW SURGERY      MD ESOPHAGOGASTRODUODENOSCOPY TRANSORAL DIAGNOSTIC N/A 12/14/2016    Procedure: EGD AND COLONOSCOPY;  Surgeon: Chidi Kothari III, MD;  Location: [facility];  Service: Gastroenterology    THUMB ARTHROSCOPY Left        FAMILY HISTORY:  Family History   Problem Relation Age of Onset    Hypertension Mother        SOCIAL HISTORY:  Social History     Tobacco Use    Smoking status: Former     Current packs/day: 0.00     Types: Cigarettes     Quit date: 5/27/2018     Years since  quittin.9    Smokeless tobacco: Never    Tobacco comments:     quit smoking @2019   Vaping Use    Vaping status: Some Days    Last attempt to quit: 2019    Substances: No Nicotine (in past), THC, CBD   Substance Use Topics    Alcohol use: Yes     Comment: 3x week    Drug use: Yes     Frequency: 7.0 times per week     Types: Marijuana     Comment: capsules and edibles       MEDICATIONS:    Current Outpatient Medications:     amLODIPine-benazepril (LOTREL) 10-20 MG per capsule, Take 1 capsule by mouth daily, Disp: , Rfl:     amLODIPine-valsartan (EXFORGE) 5-160 MG per tablet, Take 1 tablet by mouth daily, Disp: , Rfl:     ascorbic acid (VITAMIN C) 1000 MG tablet, Vitamin C 1,000 mg tablet  Take 1 tablet (1,000mg) by mouth once Daily., Disp: , Rfl:     B Complex Vitamins (VITAMIN B COMPLEX 100 IJ), vitamin B complex  Take 1 capsule by mouth once Daily., Disp: , Rfl:     butalbital-acetaminophen-caffeine (FIORICET,ESGIC) -40 mg per tablet, butalbital-acetaminophen-caffeine 50 mg-325 mg-40 mg tablet, Disp: , Rfl:     chlorhexidine (PERIDEX) 0.12 % solution, Apply 15 mL to the mouth or throat 2 (two) times a day, Disp: 120 mL, Rfl: 0    Cholecalciferol 25 MCG (1000 UT) tablet, Take 1,000 Units by mouth daily, Disp: , Rfl:     COVID-19 At Home Antigen Test KIT, , Disp: , Rfl:     COVID-19 At Home Antigen Test KIT, Take 1 capsule by mouth daily, Disp: , Rfl:     diltiazem (Cardizem) 120 MG tablet, Cardizem 120 mg tablet  Take 1 tablet (120mg) by mouth once Daily., Disp: , Rfl:     naproxen sodium (Aleve) 220 MG tablet, Aleve 220 mg tablet  Take 1 tablet (220mg) by mouth As Needed. PRN, Disp: , Rfl:     pantoprazole (PROTONIX) 40 mg tablet, TAKE ONE TABLET BY MOUTH ONCE DAILY , Disp: 90 tablet, Rfl: 0    pantoprazole (PROTONIX) 40 mg tablet, Take 40 mg by mouth daily, Disp: , Rfl:     pantoprazole (PROTONIX) 40 mg tablet, Take 1 tablet by mouth daily, Disp: , Rfl:     PARoxetine (PAXIL) 30 mg tablet, Take 1  tablet by mouth daily, Disp: , Rfl:     QUEtiapine (SEROquel) 100 mg tablet, Take 100 mg by mouth daily at bedtime, Disp: , Rfl:     QUEtiapine (SEROquel) 100 mg tablet, Take 100 mg by mouth, Disp: , Rfl:     QUEtiapine (SEROquel) 100 mg tablet, Seroquel, Disp: , Rfl:     Tadalafil (CIALIS PO), Cialis  1 daily, Disp: , Rfl:     tadalafil (CIALIS) 5 MG tablet, Take 5 mg by mouth, Disp: , Rfl:     tamsulosin (FLOMAX) 0.4 mg, Take 0.4 mg by mouth, Disp: , Rfl:     cyclobenzaprine (FLEXERIL) 10 mg tablet, Take 1 tablet (10 mg total) by mouth 3 (three) times a day for 3 days, Disp: 9 tablet, Rfl: 0    dicyclomine (BENTYL) 20 mg tablet, Take by mouth 3 times a day as needed x 7 days (Patient not taking: Reported on 5/8/2025), Disp: , Rfl:     gabapentin (NEURONTIN) 300 mg capsule, gabapentin 300 mg capsule (Patient not taking: Reported on 5/8/2025), Disp: , Rfl:     Levomilnacipran HCl ER (FETZIMA) 40 MG extended release capsule, Daily (Patient not taking: Reported on 5/8/2025), Disp: , Rfl:     Magnesium Gluconate 550 MG TABS, Take 30 mg by mouth 2 (two) times a day (Patient not taking: Reported on 5/8/2025), Disp: , Rfl:     Oregano 1500 MG CAPS, Take by mouth Takes now and then (Patient not taking: Reported on 12/22/2022), Disp: , Rfl:     OxyCODONE HCl (OXYCONTIN PO), Take by mouth (Patient not taking: Reported on 5/8/2025), Disp: , Rfl:     PARoxetine (PAXIL) 10 mg tablet, Take 10 mg by mouth every morning (Patient not taking: Reported on 5/8/2025), Disp: , Rfl:     PARoxetine (PAXIL) 10 mg tablet, Take 10 mg by mouth (Patient not taking: Reported on 5/8/2025), Disp: , Rfl:     PARoxetine (PAXIL) 40 MG tablet, Take by mouth (Patient not taking: Reported on 5/8/2025), Disp: , Rfl:     zolpidem (AMBIEN) 10 mg tablet, zolpidem 10 mg tablet (Patient not taking: Reported on 5/8/2025), Disp: , Rfl:     ALLERGIES:  Allergies   Allergen Reactions    Other Hives     Pine and dog hair    Short Ragweed Pollen Ext Rash    Ace  Inhibitors Cough         REVIEW OF SYSTEMS:  Review of Systems   Constitutional:  Negative for chills, fatigue and fever.   HENT:  Negative for drooling, ear discharge, facial swelling, hearing loss, nosebleeds, rhinorrhea and sneezing.    Eyes:  Negative for pain, discharge, redness and itching.   Respiratory:  Negative for chest tightness, shortness of breath and wheezing.    Cardiovascular:  Negative for chest pain and palpitations.   Gastrointestinal:  Negative for abdominal pain, constipation and diarrhea.   Endocrine: Negative for cold intolerance and heat intolerance.   Genitourinary:  Negative for dysuria and flank pain.   Musculoskeletal:  Negative for arthralgias, joint swelling and myalgias.   Skin:  Negative for rash.   Neurological:  Negative for dizziness, tremors, light-headedness, numbness and headaches.   Psychiatric/Behavioral:  Negative for self-injury and suicidal ideas.        VITALS:  There were no vitals filed for this visit.    LABS:  None to review.     _____________________________________________________  PHYSICAL EXAMINATION:  General: well developed and well nourished, alert, oriented times 3, and appears comfortable  Psychiatric: Normal  HEENT: Normocephalic, Atraumatic Trachea Midline, No torticollis  Pulmonary: No audible wheezing or respiratory distress   Abdomen/GI: Non tender, non distended   Cardiovascular: No pitting edema, 2+ radial pulse   Skin: No masses, erythema, lacerations, fluctation, ulcerations  Neurovascular: Sensation Intact to the Median, Ulnar, Radial Nerve, Motor Intact to the Median, Ulnar, Radial Nerve, and Pulses Intact  Musculoskeletal: Normal, except as noted in detailed exam and in HPI.      MUSCULOSKELETAL EXAMINATION:  LEFT HAND:  Left long finger:  Positive palpable nodule over the A1 pulley.  Positive tenderness to palpation over A1 pulley. Positive catching. Positive clicking.    Left ring finger: minimal contracture present at the ring finger    RIGHT  "HAND:   There is no swelling present. There is no ecchymosis noted.    Thenar atrophy appreciated.  Non-TTP throughout the elbow, wrist, and digits.  + Tinel's at the carpal tunnel, + Phalen's   + Tinel's at the cubital tunnel      ___________________________________________________  STUDIES REVIEWED:    I personally reviewed and interpreted EMG nerve conduction study:  EMG, bilateral upper extremities, 3/21/2025, demonstrate evidence of acute to subacute, severe carpal tunnel and cubital tunnel syndrome on the right side. There is, also, evidence of mild carpal tunnel on the left side.       LABS REVIEWED:  HgA1c:   Lab Results   Component Value Date    HGBA1C 5.6 12/19/2023     BMP:   Lab Results   Component Value Date    CALCIUM 9.5 10/18/2024    K 4.2 10/18/2024    CO2 29 10/18/2024     10/18/2024    BUN 13 10/18/2024    CREATININE 0.97 10/18/2024         PROCEDURES PERFORMED:  Hand/upper extremity injection: L long A1    Universal Protocol:  Consent: Verbal consent obtained.  Risks and benefits: risks, benefits and alternatives were discussed  Consent given by: patient  Time out: Immediately prior to procedure a \"time out\" was called to verify the correct patient, procedure, equipment, support staff and site/side marked as required.  Patient understanding: patient states understanding of the procedure being performed  Site marked: the operative site was marked  Patient identity confirmed: verbally with patient  Supporting Documentation  Indications: diagnostic and therapeutic   Procedure Details  Condition:trigger finger Location: long finger - L long A1   Needle size: 25 G  Ultrasound guidance: no  Approach: volar  Medications administered: 1 mL lidocaine 0.5 %; 40 mg dexamethasone 100 mg/10 mL  Patient tolerance: patient tolerated the procedure well with no immediate complications  Dressing:  Sterile dressing applied           _____________________________________________________      Scribe Attestation "      I,:  Hortensia Briceno PA-C am acting as a scribe while in the presence of the attending physician.:       I,:  Praveen Du MD personally performed the services described in this documentation    as scribed in my presence.:

## 2025-05-08 NOTE — H&P
Praveen Du M.D.  Attending, Orthopaedic Surgery  Hand, Wrist, and Elbow Surgery  [facility]      ORTHOPAEDIC HAND, WRIST, AND ELBOW OFFICE  VISIT       ASSESSMENT/PLAN:    Assessment & Plan  Trigger finger, left middle finger  Corticosteroid injection provided today       Cubital tunnel syndrome on right    Orders:    Case request operating room: Right cubital tunnel release with possible anterior ulnar nerve transposition with pedicle based adipose flap, Right carpal tunnel release; Standing    Basic metabolic panel; Future    EKG 12 lead; Future  Patient with mild cubital tunnel on the right some nerve conduction study.  Discussed cubital tunnel release at the elbow with possible anterior transposition with pedicle based adipose flap,  Carpal tunnel syndrome on right    Orders:    Case request operating room: Right cubital tunnel release with possible anterior ulnar nerve transposition with pedicle based adipose flap, Right carpal tunnel release; Standing    Basic metabolic panel; Future    EKG 12 lead; Future  Patient has severe right carpal tunnel based on clinical exam with thenar atrophy as well as nerve conduction studies which demonstrate acute on chronic severe carpal tunnel.  Patient inquired as to delaying surgery for approximately a decade we discussed that likely his carpal tunnel would progress to a point where surgery would not be as beneficial.  Benefits and alternatives to surgical intervention discussed in detail      Pre-procedure lab exam    Orders:    Basic metabolic panel; Future    EKG 12 lead; Future    Dupuytren disease  Has Dupuytren's nodules and early cords on the right side  Gopi etiology and natural history of Dupuytren's disease  Anticipate worsening of the condition with any upper extremity surgery  Discussed treatment options in the future if significant contracture occurs         Trigger Finger: The anatomy and physiology of trigger finger was discussed  with the patient today in the office.  Edema and increased contact pressure within the flexor tendons at the A1 pulley can cause pain, crepitation, and triggering or locking of the digit resulting in limitation of function.  Symptoms can occur at anytime but are typically worse in the morning or after a brief rest from repetitive activity.  Treatment options include resting/nighttime MP blocking splints to decrease edema, oral anti-inflammatory medications, home or formal therapy exercises, up to 2 steroid injections within the tendon sheath, or surgical release.  While majority of patients do respond to conservative treatment, up to 20% may require surgical release.     Dupuytren's Disease:  The anatomy and physiology of Dupuytren's disease were discussed with the patient today in the office.  Increased collagen formation (similar to scar tissue formation but without injury) within the interval between the skin volarly and the flexor tendons dorsally can result in pit formation, nodular formation, or eventual cord formation.  These pathologic cords can cause contracture at either the metacarpophalangeal joint, proximal interphalangeal joint, or both.  As the cords progress towards the proximal interphalangeal joint, the neurovascular structures of the finger may become involved within the disease process.  While this is a genetic condition with variable penetrance, repetitive micro trauma may trigger the development of cord formation and thus contracture.  Conservative treatment options including therapy to maintain joint mobility and tabletop testing were discussed.  Other treatments include Xiaflex (collagenase) injection and surgical excision of abnormal cords.     Carpal Tunnel Syndrome: The anatomy and physiology of carpal tunnel syndrome was discussed with the patient today.  Increase pressure localized under the transverse carpal ligament can cause pain, numbness, tingling, or dysesthesias within the median  nerve distribution as well as feelings of fatigue, clumsiness, or awkwardness.  These symptoms typically occur at night and worse in the morning upon waking.  Eventually, untreated carpal tunnel syndrome can result in weakness and permanent loss of muscle within the thenar compartment of the hand.  Treatment options were discussed with the patient.  Conservative treatment includes nocturnal resting splints to keep the nerve in a neutral position, ergonomic changes within the work or home environment, activity modification, and tendon gliding exercises. Vitamin B6 one tablet daily over the counter may helpful to reduce symptoms.   Steroid injections within the carpal canal can help a majority of patients, however this is often self-limited in a majority of patients.  Surgical intervention to divide the transverse carpal ligament typically results in a long-lasting relief of the patient's complaints, with the recurrence rate of less than 1%.                                                                                                                                                                                   The patient verbalized understanding of exam findings and treatment plan. We engaged in the shared decision-making process and treatment options were discussed at length with the patient. Surgical and conservative management discussed today along with risks and benefits. The patient elected to receive a trigger finger injection into the left long finger and discuss surgical intervention, in the form of right cubital and carpal tunnel release, for his right hand symptoms.     Diagnoses and all orders for this visit:    Trigger finger, left middle finger    Cubital tunnel syndrome on right  -     Case request operating room: Right cubital tunnel release with possible anterior ulnar nerve transposition with pedicle based adipose flap, Right carpal tunnel release; Standing  -     Basic metabolic panel;  Future  -     EKG 12 lead; Future  -     Case request operating room: Right cubital tunnel release with possible anterior ulnar nerve transposition with pedicle based adipose flap, Right carpal tunnel release    Carpal tunnel syndrome on right  -     Case request operating room: Right cubital tunnel release with possible anterior ulnar nerve transposition with pedicle based adipose flap, Right carpal tunnel release; Standing  -     Basic metabolic panel; Future  -     EKG 12 lead; Future  -     Case request operating room: Right cubital tunnel release with possible anterior ulnar nerve transposition with pedicle based adipose flap, Right carpal tunnel release    Pre-procedure lab exam  -     Basic metabolic panel; Future  -     EKG 12 lead; Future    Other orders  -     Hand/upper extremity injection: L long A1  -     Nursing Communication Warmimg Interventions Implemented; Standing  -     Nursing Communication CHG bath, have staff wash entire body (neck down) per pre-op bathing protocol. Routine, evening prior to, and day of surgery.; Standing  -     Nursing Communication Swab both nares with Povidone-Iodine solution, EXCLUDE if patient has shellfish/Iodine allergy, and replace with nasal alcohol swabstick. Routine, day of surgery, on call to OR; Standing  -     chlorhexidine (PERIDEX) 0.12 % oral rinse 15 mL  -     Void on call to OR; Standing  -     Insert peripheral IV; Standing  -     Diet NPO; Sips with meds; Standing  -     sodium chloride 0.9 % infusion  -     ceFAZolin (ANCEF) 2,000 mg in sodium chloride 0.9 % 50 mL IVPB  -     Apply Sequential Compression Device; Standing        Follow Up:  No follow-ups on file.    To Do Next Visit:  Re-evaluation of current issue      ____________________________________________________________________________________________________________________________________________      CHIEF COMPLAINT:  Chief Complaint   Patient presents with    Left Hand - Locking, Triggering      Left middle finger       SUBJECTIVE:  Julito Mac is a 60 y.o. year old RHD male who presents for initial evaluation of his left long finger. He complains of pain at the volar aspect of the wrist, which radiates into the long finger of the left hand. Endorses catching, which occasionally requires manual release. Endorses numbness/tingling into the left long finger, which is most prominent when the finger catches.     Additionally, he mentions that he experiences numbness and tingling into the right hand, most prominently affecting the second through fourth digits. He has previously had an EMG done on 3/21/2025.      Pain/symptom timing:  Worse during the day when active  Pain/symptom context:  Worse with activites and work  Pain/symptom modifying factors:  Rest makes better, activities make worse  Pain/symptom associated signs/symptoms: numbness/tingling     Prior treatment   NSAIDs No   Injections No   Bracing/Orthotics No    Physical Therapy No     I have personally reviewed all the relevant PMH, PSH, SH, FH, Medications and allergies      PAST MEDICAL HISTORY:  Past Medical History:   Diagnosis Date    Anxiety     Chronic pain disorder     Colon polyp     GERD (gastroesophageal reflux disease)     Hypertension     PTSD (post-traumatic stress disorder)        PAST SURGICAL HISTORY:  Past Surgical History:   Procedure Laterality Date    ANKLE SURGERY      CERVICAL SPINE SURGERY      CHOLECYSTECTOMY      COLONOSCOPY      ELBOW SURGERY      AL ESOPHAGOGASTRODUODENOSCOPY TRANSORAL DIAGNOSTIC N/A 12/14/2016    Procedure: EGD AND COLONOSCOPY;  Surgeon: Chidi Kothari III, MD;  Location: [facility];  Service: Gastroenterology    THUMB ARTHROSCOPY Left        FAMILY HISTORY:  Family History   Problem Relation Age of Onset    Hypertension Mother        SOCIAL HISTORY:  Social History     Tobacco Use    Smoking status: Former     Current packs/day: 0.00     Types: Cigarettes     Quit date: 5/27/2018     Years since  quittin.9    Smokeless tobacco: Never    Tobacco comments:     quit smoking @2019   Vaping Use    Vaping status: Some Days    Last attempt to quit: 2019    Substances: No Nicotine (in past), THC, CBD   Substance Use Topics    Alcohol use: Yes     Comment: 3x week    Drug use: Yes     Frequency: 7.0 times per week     Types: Marijuana     Comment: capsules and edibles       MEDICATIONS:    Current Outpatient Medications:     amLODIPine-benazepril (LOTREL) 10-20 MG per capsule, Take 1 capsule by mouth daily, Disp: , Rfl:     amLODIPine-valsartan (EXFORGE) 5-160 MG per tablet, Take 1 tablet by mouth daily, Disp: , Rfl:     ascorbic acid (VITAMIN C) 1000 MG tablet, Vitamin C 1,000 mg tablet  Take 1 tablet (1,000mg) by mouth once Daily., Disp: , Rfl:     B Complex Vitamins (VITAMIN B COMPLEX 100 IJ), vitamin B complex  Take 1 capsule by mouth once Daily., Disp: , Rfl:     butalbital-acetaminophen-caffeine (FIORICET,ESGIC) -40 mg per tablet, butalbital-acetaminophen-caffeine 50 mg-325 mg-40 mg tablet, Disp: , Rfl:     chlorhexidine (PERIDEX) 0.12 % solution, Apply 15 mL to the mouth or throat 2 (two) times a day, Disp: 120 mL, Rfl: 0    Cholecalciferol 25 MCG (1000 UT) tablet, Take 1,000 Units by mouth daily, Disp: , Rfl:     COVID-19 At Home Antigen Test KIT, , Disp: , Rfl:     COVID-19 At Home Antigen Test KIT, Take 1 capsule by mouth daily, Disp: , Rfl:     diltiazem (Cardizem) 120 MG tablet, Cardizem 120 mg tablet  Take 1 tablet (120mg) by mouth once Daily., Disp: , Rfl:     naproxen sodium (Aleve) 220 MG tablet, Aleve 220 mg tablet  Take 1 tablet (220mg) by mouth As Needed. PRN, Disp: , Rfl:     pantoprazole (PROTONIX) 40 mg tablet, TAKE ONE TABLET BY MOUTH ONCE DAILY , Disp: 90 tablet, Rfl: 0    pantoprazole (PROTONIX) 40 mg tablet, Take 40 mg by mouth daily, Disp: , Rfl:     pantoprazole (PROTONIX) 40 mg tablet, Take 1 tablet by mouth daily, Disp: , Rfl:     PARoxetine (PAXIL) 30 mg tablet, Take 1  tablet by mouth daily, Disp: , Rfl:     QUEtiapine (SEROquel) 100 mg tablet, Take 100 mg by mouth daily at bedtime, Disp: , Rfl:     QUEtiapine (SEROquel) 100 mg tablet, Take 100 mg by mouth, Disp: , Rfl:     QUEtiapine (SEROquel) 100 mg tablet, Seroquel, Disp: , Rfl:     Tadalafil (CIALIS PO), Cialis  1 daily, Disp: , Rfl:     tadalafil (CIALIS) 5 MG tablet, Take 5 mg by mouth, Disp: , Rfl:     tamsulosin (FLOMAX) 0.4 mg, Take 0.4 mg by mouth, Disp: , Rfl:     cyclobenzaprine (FLEXERIL) 10 mg tablet, Take 1 tablet (10 mg total) by mouth 3 (three) times a day for 3 days, Disp: 9 tablet, Rfl: 0    dicyclomine (BENTYL) 20 mg tablet, Take by mouth 3 times a day as needed x 7 days (Patient not taking: Reported on 5/8/2025), Disp: , Rfl:     gabapentin (NEURONTIN) 300 mg capsule, gabapentin 300 mg capsule (Patient not taking: Reported on 5/8/2025), Disp: , Rfl:     Levomilnacipran HCl ER (FETZIMA) 40 MG extended release capsule, Daily (Patient not taking: Reported on 5/8/2025), Disp: , Rfl:     Magnesium Gluconate 550 MG TABS, Take 30 mg by mouth 2 (two) times a day (Patient not taking: Reported on 5/8/2025), Disp: , Rfl:     Oregano 1500 MG CAPS, Take by mouth Takes now and then (Patient not taking: Reported on 12/22/2022), Disp: , Rfl:     OxyCODONE HCl (OXYCONTIN PO), Take by mouth (Patient not taking: Reported on 5/8/2025), Disp: , Rfl:     PARoxetine (PAXIL) 10 mg tablet, Take 10 mg by mouth every morning (Patient not taking: Reported on 5/8/2025), Disp: , Rfl:     PARoxetine (PAXIL) 10 mg tablet, Take 10 mg by mouth (Patient not taking: Reported on 5/8/2025), Disp: , Rfl:     PARoxetine (PAXIL) 40 MG tablet, Take by mouth (Patient not taking: Reported on 5/8/2025), Disp: , Rfl:     zolpidem (AMBIEN) 10 mg tablet, zolpidem 10 mg tablet (Patient not taking: Reported on 5/8/2025), Disp: , Rfl:     ALLERGIES:  Allergies   Allergen Reactions    Other Hives     Pine and dog hair    Short Ragweed Pollen Ext Rash    Ace  Inhibitors Cough         REVIEW OF SYSTEMS:  Review of Systems   Constitutional:  Negative for chills, fatigue and fever.   HENT:  Negative for drooling, ear discharge, facial swelling, hearing loss, nosebleeds, rhinorrhea and sneezing.    Eyes:  Negative for pain, discharge, redness and itching.   Respiratory:  Negative for chest tightness, shortness of breath and wheezing.    Cardiovascular:  Negative for chest pain and palpitations.   Gastrointestinal:  Negative for abdominal pain, constipation and diarrhea.   Endocrine: Negative for cold intolerance and heat intolerance.   Genitourinary:  Negative for dysuria and flank pain.   Musculoskeletal:  Negative for arthralgias, joint swelling and myalgias.   Skin:  Negative for rash.   Neurological:  Negative for dizziness, tremors, light-headedness, numbness and headaches.   Psychiatric/Behavioral:  Negative for self-injury and suicidal ideas.        VITALS:  There were no vitals filed for this visit.    LABS:  None to review.     _____________________________________________________  PHYSICAL EXAMINATION:  General: well developed and well nourished, alert, oriented times 3, and appears comfortable  Psychiatric: Normal  HEENT: Normocephalic, Atraumatic Trachea Midline, No torticollis  Pulmonary: No audible wheezing or respiratory distress   Abdomen/GI: Non tender, non distended   Cardiovascular: No pitting edema, 2+ radial pulse   Skin: No masses, erythema, lacerations, fluctation, ulcerations  Neurovascular: Sensation Intact to the Median, Ulnar, Radial Nerve, Motor Intact to the Median, Ulnar, Radial Nerve, and Pulses Intact  Musculoskeletal: Normal, except as noted in detailed exam and in HPI.      MUSCULOSKELETAL EXAMINATION:  LEFT HAND:  Left long finger:  Positive palpable nodule over the A1 pulley.  Positive tenderness to palpation over A1 pulley. Positive catching. Positive clicking.    Left ring finger: minimal contracture present at the ring finger    RIGHT  "HAND:   There is no swelling present. There is no ecchymosis noted.    Thenar atrophy appreciated.  Non-TTP throughout the elbow, wrist, and digits.  + Tinel's at the carpal tunnel, + Phalen's   + Tinel's at the cubital tunnel      ___________________________________________________  STUDIES REVIEWED:    I personally reviewed and interpreted EMG nerve conduction study:  EMG, bilateral upper extremities, 3/21/2025, demonstrate evidence of acute to subacute, severe carpal tunnel and cubital tunnel syndrome on the right side. There is, also, evidence of mild carpal tunnel on the left side.       LABS REVIEWED:  HgA1c:   Lab Results   Component Value Date    HGBA1C 5.6 12/19/2023     BMP:   Lab Results   Component Value Date    CALCIUM 9.5 10/18/2024    K 4.2 10/18/2024    CO2 29 10/18/2024     10/18/2024    BUN 13 10/18/2024    CREATININE 0.97 10/18/2024         PROCEDURES PERFORMED:  Hand/upper extremity injection: L long A1    Universal Protocol:  Consent: Verbal consent obtained.  Risks and benefits: risks, benefits and alternatives were discussed  Consent given by: patient  Time out: Immediately prior to procedure a \"time out\" was called to verify the correct patient, procedure, equipment, support staff and site/side marked as required.  Patient understanding: patient states understanding of the procedure being performed  Site marked: the operative site was marked  Patient identity confirmed: verbally with patient  Supporting Documentation  Indications: diagnostic and therapeutic   Procedure Details  Condition:trigger finger Location: long finger - L long A1   Needle size: 25 G  Ultrasound guidance: no  Approach: volar  Medications administered: 1 mL lidocaine 0.5 %; 40 mg dexamethasone 100 mg/10 mL  Patient tolerance: patient tolerated the procedure well with no immediate complications  Dressing:  Sterile dressing applied           _____________________________________________________      Scribe Attestation "      I,:  Hortensia Briceno PA-C am acting as a scribe while in the presence of the attending physician.:       I,:  Praveen Du MD personally performed the services described in this documentation    as scribed in my presence.:

## 2025-05-08 NOTE — PATIENT INSTRUCTIONS
What to Expect After Hand Surgery  Below are typical postoperative expectations and recommendations for our patients having hand/elbow surgery. Please understand, however, that every case and every patient are different so these recommendations are subject to change on an individual basis. Please be flexible if you are told something different on the day of surgery and understand that we do so with your best interest at heart.     Postoperative care:   Elevate your hand above your elbow for 24-48 hours after surgery to help with swelling.   Place your hand and arm over your head with motion at your shoulder three times a day.   Do NOT apply any cream/ointment/oil to your incisions including antibiotic ointment, peroxide, etc.   Do NOT soak your hands in standing water (dishwater, tubs, Jacuzzi's, pools, etc.) until given permission (typically 2-3 weeks after injury)   What to watch out for:   Increased numbness or tingling of your hand or fingers that is not relieved with elevation.   Increasing pain that is not controlled with medication.   Difficulty chewing, breathing, swallowing.   Chest pains or shortness of breath.   Fever over 101.4 degrees.   Bandages:   Please keep bandages clean and dry, you will need to cover bandages with plastic bag or cast bag when showering. Any sutures will be removed in the office, please do not try and remove these on your own. Any steri-strips will fall off on their own or we will remove them in the office as well.   For smaller procedures (carpal tunnel, trigger fingers, deQuervain's release, etc.) you will be able to remove the bandages 5-7 days from surgery. Once the bandages are removed you will be able to wash the hand and take short showers. Avoid soaking the wounds in any standing water (no dirty dishes, no pools/baths/hot tubs/ocean water, etc) until you are seen at your follow up visit.   For fractures, tendon repairs, and other larger procedures we will typically have  you keep the bandages on until we see you back in the office at your follow up visit. In some cases we may have you meet with occupational therapy before we see you back. If this is the case the therapist will remove your bandages for you and the above wound care instructions will apply.   We emphasize that you do not put anything on the surgical wounds including neosporin, lotions, oils, peroxide, etc. These can delay wound healing and open you up to infections. Bandaids are okay in some cases, but should only be in place for a short time as they can hold moisture in and break down the wound.   Motion and activity:   We encourage you to move any non-splinted fingers into a full tight fist as soon as possible after surgery unless otherwise specified by the surgical team. Hands get very stiff very quickly, so keep them moving!   Weight bearing status will depend on your surgery and will be specified in your postoperative instructions. Frequently we advise no lifting over 1-2 pounds with the operative hand, this allows you to perform activities of daily living (eating, brushing teeth/hair, etc), but also protects you from damaging the surgical site. In some cases we advise that you do not lift anything with the operative hand, but this will be specified in your instructions day of surgery.   Depending on your job and what surgery you had done some patients can return to work shortly after surgery. Typically if your job involves heavy lifting, griping, or manual labor we advise that you do not work until we see you back for your first follow up visit. These jobs carry a high risk of surgical site infections and wound healing complications  Sling:   Use of a sling will be specified in your postoperative instructions.   If you have a block done by anesthesia before surgery you will have limited use of the operative arm for around 24-48 hrs after and may require a sling to hold your arm up during that time. Once the block  wears off you may discontinue use of the sling.   Some surgeries do not require a block or a sling at all, this will be specified on day of surgery and in your postoperative instructions.   Pain medication:   We will prescribe you a one-time script of narcotic pain medications for postoperative pain, the amount will depend on what surgery was done. In addition to this we typically recommend Tylenol and Ibuprofen/naproxen for pain control, these medications work best when alternated every 3-4 hours.   Medications will vary between patients, so if you have any allergies or concerns please let us know and we can adjust our recommendations as needed.   Please let us know if you already take narcotic pain medicine regularly or if you are already established with pain management. Often times in these cases you may have signed a pain agreement with the prescribing provider and we will need to discuss with them regarding your postoperative care.   Follow-up:   Most follow up appointments are made when you schedule surgery, if you do not have a follow up by the time you schedule surgery please call the office to make an appointment. Typical follow up is 10-14 days from surgery unless otherwise specified.            What is Dupuytren's Disease?  Dupuytren's disease is an abnormal thickening of the tissue just beneath the skin. This thickening occurs in the palm and can extend into the fingers (see Figure 1). Firm pits, nodules, and cords may develop that can cause the fingers to bend into the palm (see Figure 2), which is a condition described as Dupuytren's contracture. Although the skin may become involved in the process, the deeper structures--such as the tendons--are not directly involved. Occasionally, the disease will cause thickening on top of the finger knuckles (knuckle pads), or lumps or cords within the soles of the feet (plantar fibromatosis).     Causes  The cause of Dupuytren's disease is unknown but may be  associated with certain biochemical factors within the involved tissue. The problem is more common in men over age 40 and in people of northern  descent. There is no proven evidence that hand injuries or specific occupational exposures lead to a higher risk of developing Dupuytren's disease.    Signs and Symptoms  Symptoms of Dupuytren's disease usually include lumps and pits within the palm. The lumps are generally firm and adherent to the skin. Thick cords may develop, extending from the palm into one or more fingers, with the ring and little fingers most commonly affected. These cords may be mistaken for tendons, but they actually lie between the skin and the tendons. These cords cause bending or contractures of the fingers. In many cases, both hands are affected, although the degree of involvement may vary. The initial lumps may produce discomfort that usually resolves, but Dupuytren's disease is not typically painful. The disease may first be noticed because of difficulty placing the hand flat on an even surface, such as a tabletop (see Figure 3). As the tissue thickens and fingers are drawn into the palm, one may notice increasing difficulty with activities such as washing, wearing gloves, shaking hands, and putting hands into pockets. Progression is unpredictable. Some individuals will have only small lumps or cords while others will develop severely bent fingers. More severe disease often occurs with an earlier age of onset.      Treatment  In mild cases, especially if hand function is not affected, only observation is needed. For more severe cases, various treatment options are available in order to straighten the finger(s). These options may include needles or open surgery. Your treating surgeon will discuss the method most appropriate for your condition based upon the stage and pattern of the disease and the joints involved. The goal of treatment is to improve finger position and thereby hand  function. Despite treatment, the disease process may recur. Before treatment, your treating surgeon will discuss realistic goals, possible risks and results. Specific surgical considerations include the following:   The presence of a lump in the palm does not mean that surgery is required or that the disease will progress.   Correction of finger position is best accomplished with milder contractures or contractures that affect the base of the finger.  Complete correction sometimes cannot be attained, especially of the middle and end joints in the finger.   Skin grafts are sometimes required to cover open areas in the fingers if the skin is deficient.   The nerves that provide feeling to the fingertips are often intertwined with the cords.   Splinting and hand therapy are often required after surgery in order to maximize and maintain the improvement in finger position and function.    © 2012 [employer]  www.handcare.org

## 2025-05-08 NOTE — ASSESSMENT & PLAN NOTE
Has Dupuytren's nodules and early cords on the right side  Gopi etiology and natural history of Dupuytren's disease  Anticipate worsening of the condition with any upper extremity surgery  Discussed treatment options in the future if significant contracture occurs

## 2025-05-21 ENCOUNTER — HOSPITAL ENCOUNTER (EMERGENCY)
Facility: HOSPITAL | Age: 60
Discharge: HOME | End: 2025-05-21
Attending: EMERGENCY MEDICINE
Payer: MEDICARE

## 2025-05-21 ENCOUNTER — APPOINTMENT (EMERGENCY)
Dept: RADIOLOGY | Facility: HOSPITAL | Age: 60
End: 2025-05-21
Payer: MEDICARE

## 2025-05-21 VITALS
SYSTOLIC BLOOD PRESSURE: 128 MMHG | TEMPERATURE: 98 F | DIASTOLIC BLOOD PRESSURE: 95 MMHG | OXYGEN SATURATION: 99 % | HEART RATE: 98 BPM | RESPIRATION RATE: 20 BRPM

## 2025-05-21 DIAGNOSIS — S61.210A LACERATION OF RIGHT INDEX FINGER: Primary | ICD-10-CM

## 2025-05-21 PROCEDURE — 99284 EMERGENCY DEPT VISIT MOD MDM: CPT | Performed by: PHYSICIAN ASSISTANT

## 2025-05-21 PROCEDURE — 73140 X-RAY EXAM OF FINGER(S): CPT

## 2025-05-21 PROCEDURE — 99283 EMERGENCY DEPT VISIT LOW MDM: CPT

## 2025-05-21 PROCEDURE — G0168 WOUND CLOSURE BY ADHESIVE: HCPCS | Performed by: PHYSICIAN ASSISTANT

## 2025-05-21 RX ORDER — IBUPROFEN 600 MG/1
600 TABLET, FILM COATED ORAL EVERY 6 HOURS PRN
Qty: 10 TABLET | Refills: 0 | Status: SHIPPED | OUTPATIENT
Start: 2025-05-21

## 2025-05-21 NOTE — Clinical Note
Julito Mac was seen and treated in our emergency department on 5/21/2025.                Diagnosis: Laceration right index    Julito  .    He may return on this date: 05/23/2025         If you have any questions or concerns, please don't hesitate to call.      Troy Cavazos PA-C    ______________________________           _______________          _______________  Hospital Representative                              Date                                Time

## 2025-05-21 NOTE — ED PROVIDER NOTES
Time reflects when diagnosis was documented in both MDM as applicable and the Disposition within this note       Time User Action Codes Description Comment    5/21/2025 10:58 AM Troy Waggoner Add [S61.210A] Laceration of right index finger           ED Disposition       ED Disposition   Discharge    Condition   Stable    Date/Time   Wed May 21, 2025 10:58 AM    Comment   Julito Mac discharge to home/self care.                   Assessment & Plan       Medical Decision Making  Is a 60-year-old male patient who reached into the his garbage bag this morning and believes that he was cut by glass and feels that there is foreign body when he goes to move it is a sharp but he has no skin intact.  Bleeding is now controlled sensation is intact.  Tetanus was approximately 8 months ago.  Differential diagnosis includes not limited to laceration with or without foreign body, tendon injury less likely    Problems Addressed:  Laceration of right index finger: acute illness or injury     Details: Superficial laceration to his right index finger he did not appear to involve any tendon.  There was no foreign body on x-ray nor on physical exam however he did have some pain at that area I did explain to him that this could be the fact that he irritated the nerve and will take several days.  He does have a hand surgeon outpatient for carpal tunnel syndrome.  He was neurovascularly intact upon discharge.  As noted in my course patient was very difficult about treatment plan and not putting any type of ointment over the glued wound.  He was very insistent on having to put cocoa butter or triple antibiotic ointment which I told him would slough off the glue.  I did provide him with extra Band-Aids and a referral.  I am hoping that they met his expectations but based on our conversations I am not quite sure that I was able    Amount and/or Complexity of Data Reviewed  Radiology: ordered and independent interpretation performed.      Details: I personally interpreted the finger x-ray there is no acute fracture or foreign body  Discussion of management or test interpretation with external provider(s): Using joint decision-making patient we discharged home I did give him Augmentin because he states he did not want to take Keflex but he states he may not take the antibiotic either I did this because there was dirt on the wound that I thoroughly cleansed he was placed in a splint for comfort given extra Band-Aids told to follow-up with hand surgeon return with any worsening symptoms questions comments or concerns he verbalized understanding and agreement    Risk  Prescription drug management.        ED Course as of 05/21/25 1203   Wed May 21, 2025   1056 During the patient's visit he has been very difficult.  He wanted me to hurry his exam because he had to take a call at 10:00 in the morning.  After the x-ray I advised him that he had no foreign body he states he still had an pain and I offered him Tylenol and Motrin he states is not strong enough there is no fracture or foreign body.  I explained that giving narcotics would not be appropriate for this matter.  After the wound was cleaned and glue was applied I also applied a large Band-Aid which she asked for bandage for home which I obliged and then put a splint on it he then stated that he wants to put triple antibiotic ointment over the glue which I told him is the end and do not and will slough the glue off.  He was complaining that with the glue his hands will get dirty when he works so I gave him gloves.  Because the wound was dirty I offered to give him Keflex and he states it bothers his stomach and or that he take his amoxicillin which will not properly cover skin irish.  I have tried to address all the patient's complaints and needs and wants however I feel that he has unrealistic expectations   1115 Upon discharge it lasted about 12 minutes patient keeps requesting for triple antibiotic  biotic ointment on it, cocoa butter because it helps wounds heal faster on his hands.  I explained to him the glue with its own keeper.  All he can do is wash his hand gently and leave the wound alone he will care for itself with the glue.  He seems very frustrated with the fact that I keep telling him not to put any type of emollient on his wound because of the glue.  I did explain that the wound was too small to suture closed but the glue would be the perfect foil to close this wound.  He will follow-up with the hand surgeon that he is scheduled to have carpal tunnel with.  He did verbalize understanding and agreement but seemed upset because he cannot put triple antibiotic ointment on his wound       Medications - No data to display    ED Risk Strat Scores                    No data recorded        SBIRT 22yo+      Flowsheet Row Most Recent Value   Initial Alcohol Screen: US AUDIT-C     1. How often do you have a drink containing alcohol? 0 Filed at: 05/21/2025 1107   2. How many drinks containing alcohol do you have on a typical day you are drinking?  0 Filed at: 05/21/2025 1107   3a. Male UNDER 65: How often do you have five or more drinks on one occasion? 0 Filed at: 05/21/2025 1107   Audit-C Score 0 Filed at: 05/21/2025 1107   KIMBERLY: How many times in the past year have you...    Used an illegal drug or used a prescription medication for non-medical reasons? Never Filed at: 05/21/2025 1107                            History of Present Illness       Chief Complaint   Patient presents with    Finger Laceration     Patient was reaching into garbage and hit glass on his finger. Unsure if any pieces remained. Bleeding controlled   Right second finger       Past Medical History:   Diagnosis Date    Anxiety     Chronic pain disorder     Colon polyp     GERD (gastroesophageal reflux disease)     Hypertension     PTSD (post-traumatic stress disorder)       Past Surgical History:   Procedure Laterality Date    ANKLE  SURGERY      CERVICAL SPINE SURGERY      CHOLECYSTECTOMY      COLONOSCOPY      ELBOW SURGERY      SC ESOPHAGOGASTRODUODENOSCOPY TRANSORAL DIAGNOSTIC N/A 12/14/2016    Procedure: EGD AND COLONOSCOPY;  Surgeon: Chidi Kothari III, MD;  Location: [facility];  Service: Gastroenterology    THUMB ARTHROSCOPY Left       Family History   Problem Relation Age of Onset    Hypertension Mother       Social History[1]   E-Cigarette/Vaping    E-Cigarette Use Current Some Day User     Quit Date 5/27/19     Comments couple times week       E-Cigarette/Vaping Substances    Nicotine No in past    THC Yes     CBD Yes       I have reviewed and agree with the history as documented.     This is a 60-year-old male patient who recently was [place] this morning and states that he was stuck in the right medial aspect of his index finger.  States he was bleeding and placed multiple Band-Aids over and is now bleeding is controlled there is no numbness or tingling.  He states when he goes to bend the finger he gets a sharp pain and feels like there is a foreign body.  He is neurovascularly intact otherwise.  Last tetanus was approximately months ago he is right-hand dominant.  Nothing makes his better or worse at this time he will have an x-ray to rule out a foreign body and I will reevaluate the wound to see if it requires closure        Review of Systems   Constitutional:  Negative for chills, diaphoresis, fatigue and fever.   HENT:  Negative for congestion, ear pain, nosebleeds and sore throat.    Eyes:  Negative for photophobia, pain, discharge and visual disturbance.   Respiratory:  Negative for cough, choking, chest tightness, shortness of breath and wheezing.    Cardiovascular:  Negative for chest pain and palpitations.   Gastrointestinal:  Negative for abdominal distention, abdominal pain, diarrhea and vomiting.   Genitourinary:  Negative for dysuria, flank pain, frequency and hematuria.   Musculoskeletal:  Negative for arthralgias,  back pain, gait problem and joint swelling.   Skin:  Positive for wound. Negative for color change and rash.   Neurological:  Negative for dizziness, seizures, syncope and headaches.   Psychiatric/Behavioral:  Negative for behavioral problems and confusion. The patient is not nervous/anxious.    All other systems reviewed and are negative.          Objective       ED Triage Vitals   Temperature Pulse Blood Pressure Respirations SpO2 Patient Position - Orthostatic VS   05/21/25 0936 05/21/25 0935 05/21/25 0935 05/21/25 0935 05/21/25 0935 --   98 °F (36.7 °C) 98 128/95 20 99 %       Temp src Heart Rate Source BP Location FiO2 (%) Pain Score    -- -- -- -- 05/21/25 0935        7      Vitals      Date and Time Temp Pulse SpO2 Resp BP Pain Score FACES Pain Rating User   05/21/25 0945 -- -- -- -- 128/95 -- -- AP   05/21/25 0936 98 °F (36.7 °C) -- -- -- -- -- -- AP   05/21/25 0935 -- 98 99 % 20 128/95 7 -- AP            Physical Exam  Vitals and nursing note reviewed.   Constitutional:       General: He is not in acute distress.     Appearance: Normal appearance. He is well-developed. He is not ill-appearing, toxic-appearing or diaphoretic.   HENT:      Head: Normocephalic and atraumatic.     Eyes:      Conjunctiva/sclera: Conjunctivae normal.       Cardiovascular:      Rate and Rhythm: Normal rate and regular rhythm.      Heart sounds: No murmur heard.  Pulmonary:      Effort: Pulmonary effort is normal. No respiratory distress.      Breath sounds: Normal breath sounds.   Abdominal:      Palpations: Abdomen is soft.      Tenderness: There is no abdominal tenderness.     Musculoskeletal:         General: No swelling.        Hands:       Cervical back: Neck supple.     Skin:     General: Skin is warm and dry.      Capillary Refill: Capillary refill takes less than 2 seconds.     Neurological:      Mental Status: He is alert.     Psychiatric:         Mood and Affect: Mood normal.         Results Reviewed       None       "      XR finger second digit-index RIGHT   ED Interpretation by Troy Cavazos PA-C (05/21 1040)   No fracture or foreign body there was a nutrient vessel noted however it was distal to where the wound was.            Universal Protocol:  procedure performed by consultantConsent: Verbal consent obtained  Risks and benefits: risks, benefits and alternatives were discussed  Consent given by: patient  Time out: Immediately prior to procedure a \"time out\" was called to verify the correct patient, procedure, equipment, support staff and site/side marked as required.  Patient understanding: patient states understanding of the procedure being performed  Patient identity confirmed: verbally with patient  Laceration repair    Date/Time: 5/21/2025 10:58 AM    Performed by: Troy Cavazos PA-C  Authorized by: Troy Cavazos PA-C  Location: right index.  Laceration length: 0.5 cm  Contamination: The wound is contaminated.  Foreign bodies: no foreign bodies  Tendon involvement: none  Nerve involvement: none  Vascular damage: no      Procedure Details:  Preparation: Patient was prepped and draped in the usual sterile fashion.  Irrigation solution: saline  Irrigation method: syringe  Amount of cleaning: extensive  Debridement: none  Degree of undermining: none  Skin closure: glue  Approximation: close  Approximation difficulty: simple  Dressing: band-aide.  Patient tolerance: patient tolerated the procedure well with no immediate complications      Splint application    Date/Time: 5/21/2025 12:03 PM    Performed by: Troy Cavazos PA-C  Authorized by: Troy Cavazos PA-C    Comments:      Splint application: Aluminum finger static splint was applied, Applied by me, good position, neurovascular tendon intact, good capillary refill. Evaluated by me prior to discharge.        ED Medication and Procedure Management   Prior to Admission Medications   Prescriptions Last Dose Informant Patient Reported? " Taking?   B Complex Vitamins (VITAMIN B COMPLEX 100 IJ)   Yes No   Sig: vitamin B complex   Take 1 capsule by mouth once Daily.   COVID-19 At Home Antigen Test KIT   Yes No   COVID-19 At Home Antigen Test KIT   Yes No   Sig: Take 1 capsule by mouth daily   Cholecalciferol 25 MCG (1000 UT) tablet   Yes No   Sig: Take 1,000 Units by mouth daily   Levomilnacipran HCl ER (FETZIMA) 40 MG extended release capsule   Yes No   Sig: Daily   Patient not taking: Reported on 5/8/2025   Magnesium Gluconate 550 MG TABS   Yes No   Sig: Take 30 mg by mouth 2 (two) times a day   Patient not taking: Reported on 5/8/2025   Oregano 1500 MG CAPS  Self Yes No   Sig: Take by mouth Takes now and then   Patient not taking: Reported on 12/22/2022   OxyCODONE HCl (OXYCONTIN PO)   Yes No   Sig: Take by mouth   Patient not taking: Reported on 5/8/2025   PARoxetine (PAXIL) 10 mg tablet   Yes No   Sig: Take 10 mg by mouth every morning   Patient not taking: Reported on 5/8/2025   PARoxetine (PAXIL) 10 mg tablet   Yes No   Sig: Take 10 mg by mouth   Patient not taking: Reported on 5/8/2025   PARoxetine (PAXIL) 30 mg tablet   Yes No   Sig: Take 1 tablet by mouth daily   PARoxetine (PAXIL) 40 MG tablet   Yes No   Sig: Take by mouth   Patient not taking: Reported on 5/8/2025   QUEtiapine (SEROquel) 100 mg tablet   Yes No   Sig: Take 100 mg by mouth daily at bedtime   QUEtiapine (SEROquel) 100 mg tablet   Yes No   Sig: Take 100 mg by mouth   QUEtiapine (SEROquel) 100 mg tablet   Yes No   Sig: Seroquel   Tadalafil (CIALIS PO)   Yes No   Sig: Cialis   1 daily   amLODIPine-benazepril (LOTREL) 10-20 MG per capsule   Yes No   Sig: Take 1 capsule by mouth daily   amLODIPine-valsartan (EXFORGE) 5-160 MG per tablet   Yes No   Sig: Take 1 tablet by mouth daily   ascorbic acid (VITAMIN C) 1000 MG tablet   Yes No   Sig: Vitamin C 1,000 mg tablet   Take 1 tablet (1,000mg) by mouth once Daily.   butalbital-acetaminophen-caffeine (FIORICET,ESGIC) -40 mg per  tablet   Yes No   Sig: butalbital-acetaminophen-caffeine 50 mg-325 mg-40 mg tablet   chlorhexidine (PERIDEX) 0.12 % solution  Self No No   Sig: Apply 15 mL to the mouth or throat 2 (two) times a day   cyclobenzaprine (FLEXERIL) 10 mg tablet   No No   Sig: Take 1 tablet (10 mg total) by mouth 3 (three) times a day for 3 days   dicyclomine (BENTYL) 20 mg tablet   Yes No   Sig: Take by mouth 3 times a day as needed x 7 days   Patient not taking: Reported on 5/8/2025   diltiazem (Cardizem) 120 MG tablet   Yes No   Sig: Cardizem 120 mg tablet   Take 1 tablet (120mg) by mouth once Daily.   gabapentin (NEURONTIN) 300 mg capsule   Yes No   Sig: gabapentin 300 mg capsule   Patient not taking: Reported on 5/8/2025   pantoprazole (PROTONIX) 40 mg tablet   No No   Sig: TAKE ONE TABLET BY MOUTH ONCE DAILY    pantoprazole (PROTONIX) 40 mg tablet   Yes No   Sig: Take 40 mg by mouth daily   pantoprazole (PROTONIX) 40 mg tablet   Yes No   Sig: Take 1 tablet by mouth daily   tadalafil (CIALIS) 5 MG tablet   Yes No   Sig: Take 5 mg by mouth   tamsulosin (FLOMAX) 0.4 mg   Yes No   Sig: Take 0.4 mg by mouth   zolpidem (AMBIEN) 10 mg tablet   Yes No   Sig: zolpidem 10 mg tablet   Patient not taking: Reported on 5/8/2025      Facility-Administered Medications: None     Discharge Medication List as of 5/21/2025 11:09 AM        START taking these medications    Details   amoxicillin-clavulanate (AUGMENTIN) 875-125 mg per tablet Take 1 tablet by mouth every 12 (twelve) hours for 5 days, Starting Wed 5/21/2025, Until Mon 5/26/2025, Normal      ibuprofen (MOTRIN) 600 mg tablet Take 1 tablet (600 mg total) by mouth every 6 (six) hours as needed for mild pain, Starting Wed 5/21/2025, Normal           CONTINUE these medications which have NOT CHANGED    Details   amLODIPine-benazepril (LOTREL) 10-20 MG per capsule Take 1 capsule by mouth daily, Historical Med      amLODIPine-valsartan (EXFORGE) 5-160 MG per tablet Take 1 tablet by mouth daily,  Starting Tue 11/22/2022, Historical Med      ascorbic acid (VITAMIN C) 1000 MG tablet Vitamin C 1,000 mg tablet   Take 1 tablet (1,000mg) by mouth once Daily., Historical Med      B Complex Vitamins (VITAMIN B COMPLEX 100 IJ) vitamin B complex   Take 1 capsule by mouth once Daily., Historical Med      butalbital-acetaminophen-caffeine (FIORICET,ESGIC) -40 mg per tablet butalbital-acetaminophen-caffeine 50 mg-325 mg-40 mg tablet, Historical Med      chlorhexidine (PERIDEX) 0.12 % solution Apply 15 mL to the mouth or throat 2 (two) times a day, Starting Tue 9/6/2022, Normal      Cholecalciferol 25 MCG (1000 UT) tablet Take 1,000 Units by mouth daily, Historical Med      !! COVID-19 At Home Antigen Test KIT Historical Med      !! COVID-19 At Home Antigen Test KIT Take 1 capsule by mouth daily, Historical Med      cyclobenzaprine (FLEXERIL) 10 mg tablet Take 1 tablet (10 mg total) by mouth 3 (three) times a day for 3 days, Starting Fri 12/22/2023, Until Mon 12/25/2023, Normal      diltiazem (Cardizem) 120 MG tablet Cardizem 120 mg tablet   Take 1 tablet (120mg) by mouth once Daily., Historical Med      !! pantoprazole (PROTONIX) 40 mg tablet TAKE ONE TABLET BY MOUTH ONCE DAILY , Normal      !! pantoprazole (PROTONIX) 40 mg tablet Take 40 mg by mouth daily, Historical Med      !! pantoprazole (PROTONIX) 40 mg tablet Take 1 tablet by mouth daily, Starting Wed 12/7/2016, Historical Med      PARoxetine (PAXIL) 30 mg tablet Take 1 tablet by mouth daily, Starting Wed 12/7/2016, Historical Med      !! QUEtiapine (SEROquel) 100 mg tablet Take 100 mg by mouth daily at bedtime, Historical Med      !! QUEtiapine (SEROquel) 100 mg tablet Take 100 mg by mouth, Historical Med      !! QUEtiapine (SEROquel) 100 mg tablet Seroquel, Historical Med      !! Tadalafil (CIALIS PO) Cialis   1 daily, Historical Med      !! tadalafil (CIALIS) 5 MG tablet Take 5 mg by mouth, Historical Med      tamsulosin (FLOMAX) 0.4 mg Take 0.4 mg by  mouth, Historical Med       !! - Potential duplicate medications found. Please discuss with provider.        STOP taking these medications       dicyclomine (BENTYL) 20 mg tablet Comments:   Reason for Stopping:         gabapentin (NEURONTIN) 300 mg capsule Comments:   Reason for Stopping:         Levomilnacipran HCl ER (FETZIMA) 40 MG extended release capsule Comments:   Reason for Stopping:         Magnesium Gluconate 550 MG TABS Comments:   Reason for Stopping:         Oregano 1500 MG CAPS Comments:   Reason for Stopping:         OxyCODONE HCl (OXYCONTIN PO) Comments:   Reason for Stopping:         zolpidem (AMBIEN) 10 mg tablet Comments:   Reason for Stopping:             No discharge procedures on file.  ED SEPSIS DOCUMENTATION   Time reflects when diagnosis was documented in both MDM as applicable and the Disposition within this note       Time User Action Codes Description Comment    2025 10:58 AM Troy Cavazos Add [S61.210A] Laceration of right index finger                    [1]   Social History  Tobacco Use    Smoking status: Former     Current packs/day: 0.00     Types: Cigarettes     Quit date: 2018     Years since quittin.9    Smokeless tobacco: Never    Tobacco comments:     quit smoking @2019   Vaping Use    Vaping status: Some Days    Last attempt to quit: 2019    Substances: No Nicotine (in past), THC, CBD   Substance Use Topics    Alcohol use: Yes     Comment: 3x week    Drug use: Yes     Frequency: 7.0 times per week     Types: Marijuana     Comment: capsules and edibles        Troy Cavazos PA-C  [phone]

## 2025-05-21 NOTE — DISCHARGE INSTRUCTIONS
Wear your splint for comfort.    Do not use triple antibiotic ointment over your glue.    Call and follow-up with a hand doctor listed.    I do not see any fractures or foreign bodies.  The radiologist will reread my film and if something changes we will contact you

## 2025-05-30 ENCOUNTER — APPOINTMENT (OUTPATIENT)
Dept: LAB | Facility: AMBULARY SURGERY CENTER | Age: 60
End: 2025-05-30
Attending: SURGERY
Payer: MEDICARE

## 2025-05-30 ENCOUNTER — APPOINTMENT (OUTPATIENT)
Dept: LAB | Facility: AMBULARY SURGERY CENTER | Age: 60
End: 2025-05-30
Payer: MEDICARE

## 2025-05-30 DIAGNOSIS — G56.21 CUBITAL TUNNEL SYNDROME ON RIGHT: ICD-10-CM

## 2025-05-30 DIAGNOSIS — G56.01 CARPAL TUNNEL SYNDROME ON RIGHT: ICD-10-CM

## 2025-05-30 DIAGNOSIS — Z01.812 PRE-PROCEDURE LAB EXAM: ICD-10-CM

## 2025-05-30 LAB
ANION GAP SERPL CALCULATED.3IONS-SCNC: 4 MMOL/L (ref 4–13)
BUN SERPL-MCNC: 18 MG/DL (ref 5–25)
CALCIUM SERPL-MCNC: 8.9 MG/DL (ref 8.4–10.2)
CHLORIDE SERPL-SCNC: 107 MMOL/L (ref 96–108)
CO2 SERPL-SCNC: 30 MMOL/L (ref 21–32)
CREAT SERPL-MCNC: 0.87 MG/DL (ref 0.6–1.3)
GFR SERPL CREATININE-BSD FRML MDRD: 93 ML/MIN/1.73SQ M
GLUCOSE SERPL-MCNC: 126 MG/DL (ref 65–140)
POTASSIUM SERPL-SCNC: 3.9 MMOL/L (ref 3.5–5.3)
SODIUM SERPL-SCNC: 141 MMOL/L (ref 135–147)

## 2025-05-30 PROCEDURE — 36415 COLL VENOUS BLD VENIPUNCTURE: CPT

## 2025-05-30 PROCEDURE — 80048 BASIC METABOLIC PNL TOTAL CA: CPT

## 2025-05-30 PROCEDURE — 93010 ELECTROCARDIOGRAM REPORT: CPT | Performed by: INTERNAL MEDICINE

## 2025-06-05 ENCOUNTER — ANESTHESIA EVENT (OUTPATIENT)
Age: 60
End: 2025-06-05
Payer: MEDICARE

## 2025-06-05 ENCOUNTER — OFFICE VISIT (OUTPATIENT)
Dept: OBGYN CLINIC | Facility: HOSPITAL | Age: 60
End: 2025-06-05
Payer: MEDICARE

## 2025-06-05 VITALS — BODY MASS INDEX: 28.25 KG/M2 | HEIGHT: 67 IN | WEIGHT: 180 LBS

## 2025-06-05 DIAGNOSIS — S61.210A LACERATION OF RIGHT INDEX FINGER WITHOUT FOREIGN BODY WITHOUT DAMAGE TO NAIL, INITIAL ENCOUNTER: Primary | ICD-10-CM

## 2025-06-05 PROCEDURE — 99213 OFFICE O/P EST LOW 20 MIN: CPT | Performed by: SURGERY

## 2025-06-05 NOTE — PROGRESS NOTES
Praveen Du M.D.  Attending, Orthopaedic Surgery  Hand, Wrist, and Elbow Surgery  [facility]      ORTHOPAEDIC HAND, WRIST, AND ELBOW OFFICE  VISIT       ASSESSMENT/PLAN:        Assessment & Plan  Laceration of right index finger without foreign body without damage to nail, initial encounter  Xrays of the right index finger were obtained and reviewed  On exam, he has hypersensitivity over the laceration on the ulnar aspect of the finger near the PIP joint  Our in house OT will to provide desensitization exercises along with scar putty.  Discussed with the patient that this injury does not delay his upcoming right carpal and cubital tunnel releases, but if the patient feels that he has too much pain discomfort from the finger laceration he can always postpone his upcoming surgery.  Patient shows understanding and will decide if he wants to delay.  Reiterated the surgery with the patient along with the location of surgical incisions and postop protocol with it being wrapped for 5 days with no heavy lifting.  We will follow-up with the patient as needed for this laceration  Orders:    Ambulatory Referral to PT/OT Hand Therapy; Future          The patient verbalized understanding of exam findings and treatment plan. We engaged in the shared decision-making process and treatment options were discussed at length with the patient. Surgical and conservative management discussed today along with risks and benefits.    Diagnoses and all orders for this visit:    Laceration of right index finger without foreign body without damage to nail, initial encounter  -     Ambulatory Referral to PT/OT Hand Therapy; Future        Follow Up:  Return if symptoms worsen or fail to improve.    To Do Next Visit:  Re-evaluation of current issue      ____________________________________________________________________________________________________________________________________________      CHIEF  COMPLAINT:  Chief Complaint   Patient presents with    Right Hand - Pain     Has a bad cut on this hand. Pointer finger. About 2 weeks ago this happened.        SUBJECTIVE:  Julito Mac is a 60 y.o. year old RHD male who presents today for a consultation for a laceration of the index finger of the right hand that was seen in the ED on 5/21/2025.  Patient reports that he cut his right index finger on glass while reaching into a garbage bag.  He notes pain and sensitivity on the ulnar border of the index finger.      Pain/symptom timing:  Worse during the day when active  Pain/symptom context:  Worse with activites and work  Pain/symptom modifying factors:  Rest makes better, activities make worse  Pain/symptom associated signs/symptoms: none    Prior treatment   NSAIDsNo   Injections No   Bracing/Orthotics No    Physical Therapy No     I have personally reviewed all the relevant PMH, PSH, SH, FH, Medications and allergies      PAST MEDICAL HISTORY:  Past Medical History[1]    PAST SURGICAL HISTORY:  Past Surgical History[2]    FAMILY HISTORY:  Family History[3]    SOCIAL HISTORY:  Social History[4]    MEDICATIONS:  Current Medications[5]    ALLERGIES:  Allergies[6]        REVIEW OF SYSTEMS:  Review of Systems    VITALS:  There were no vitals filed for this visit.    LABS:      _____________________________________________________  PHYSICAL EXAMINATION:  General: well developed and well nourished, alert, oriented times 3, and appears comfortable  Psychiatric: Normal  HEENT: Normocephalic, Atraumatic Trachea Midline, No torticollis  Pulmonary: No audible wheezing or respiratory distress   Abdomen/GI: Non tender, non distended   Cardiovascular: No pitting edema, 2+ radial pulse   Skin: No masses, erythema, lacerations, fluctation, ulcerations  Neurovascular: Sensation Intact to the Median, Ulnar, Radial Nerve, Motor Intact to the Median, Ulnar, Radial Nerve, and Pulses Intact  Musculoskeletal: Normal, except as noted  in detailed exam and in HPI.      MUSCULOSKELETAL EXAMINATION:    Right index:  Healed laceration located over the ulnar aspect over PIP joint  Hypersensitivity over the area although has sensation   Full composite fist with full extension   Brisk capillary refill   ___________________________________________________  STUDIES REVIEWED:  I have personally reviewed and interpreted  AP lateral and oblique radiographs of chris right index 3 views   which demonstrate no acute fracture dislocation, severe CMC arthrosis          LABS REVIEWED:    HgA1c:   Lab Results   Component Value Date    HGBA1C 5.6 12/19/2023     BMP:   Lab Results   Component Value Date    CALCIUM 8.9 05/30/2025    K 3.9 05/30/2025    CO2 30 05/30/2025     05/30/2025    BUN 18 05/30/2025    CREATININE 0.87 05/30/2025               PROCEDURES PERFORMED:  Procedures  No Procedures performed today    _____________________________________________________      Scribe Attestation      I,:  Angelique Arrieta am acting as a scribe while in the presence of the attending physician.:       I,:  Praveen Du MD personally performed the services described in this documentation    as scribed in my presence.:                           [1]   Past Medical History:  Diagnosis Date    Anxiety     Chronic pain disorder     Colon polyp     GERD (gastroesophageal reflux disease)     Hypertension     PTSD (post-traumatic stress disorder)    [2]   Past Surgical History:  Procedure Laterality Date    ANKLE SURGERY      CERVICAL SPINE SURGERY      CHOLECYSTECTOMY      COLONOSCOPY      ELBOW SURGERY      [place] ESOPHAGOGASTRODUODENOSCOPY TRANSORAL DIAGNOSTIC N/A 12/14/2016    Procedure: EGD AND COLONOSCOPY;  Surgeon: Chidi Kothari III, MD;  Location: [facility];  Service: Gastroenterology    THUMB ARTHROSCOPY Left    [3]   Family History  Problem Relation Name Age of Onset    Hypertension Mother     [4]   Social History  Tobacco Use    Smoking status: Former      Current packs/day: 0.00     Types: Cigarettes     Quit date: 2018     Years since quittin.0    Smokeless tobacco: Never    Tobacco comments:     quit smoking @2019   Vaping Use    Vaping status: Some Days    Last attempt to quit: 2019    Substances: No Nicotine (in past), THC, CBD   Substance Use Topics    Alcohol use: Yes     Comment: 3x week    Drug use: Yes     Frequency: 7.0 times per week     Types: Marijuana     Comment: capsules and edibles   [5]   Current Outpatient Medications:     amLODIPine-valsartan (EXFORGE) 5-160 MG per tablet, Take 1 tablet by mouth in the morning., Disp: , Rfl:     butalbital-acetaminophen-caffeine (FIORICET,ESGIC) -40 mg per tablet, , Disp: , Rfl:     COVID-19 At Home Antigen Test KIT, , Disp: , Rfl:     pantoprazole (PROTONIX) 40 mg tablet, TAKE ONE TABLET BY MOUTH ONCE DAILY , Disp: 90 tablet, Rfl: 0    pantoprazole (PROTONIX) 40 mg tablet, Take 40 mg by mouth in the morning., Disp: , Rfl:     pantoprazole (PROTONIX) 40 mg tablet, Take 1 tablet by mouth in the morning., Disp: , Rfl:     QUEtiapine (SEROquel) 100 mg tablet, Take 100 mg by mouth daily at bedtime, Disp: , Rfl:     QUEtiapine (SEROquel) 100 mg tablet, Take 100 mg by mouth, Disp: , Rfl:     QUEtiapine (SEROquel) 100 mg tablet, , Disp: , Rfl:     tamsulosin (FLOMAX) 0.4 mg, Take 0.4 mg by mouth, Disp: , Rfl:     amLODIPine-benazepril (LOTREL) 10-20 MG per capsule, Take 1 capsule by mouth daily, Disp: , Rfl:     ascorbic acid (VITAMIN C) 1000 MG tablet, Vitamin C 1,000 mg tablet  Take 1 tablet (1,000mg) by mouth once Daily., Disp: , Rfl:     B Complex Vitamins (VITAMIN B COMPLEX 100 IJ), vitamin B complex  Take 1 capsule by mouth once Daily., Disp: , Rfl:     chlorhexidine (PERIDEX) 0.12 % solution, Apply 15 mL to the mouth or throat 2 (two) times a day, Disp: 120 mL, Rfl: 0    Cholecalciferol 25 MCG (1000 UT) tablet, Take 1,000 Units by mouth daily, Disp: , Rfl:     COVID-19 At Home Antigen Test KIT,  Take 1 capsule by mouth daily, Disp: , Rfl:     cyclobenzaprine (FLEXERIL) 10 mg tablet, Take 1 tablet (10 mg total) by mouth 3 (three) times a day for 3 days, Disp: 9 tablet, Rfl: 0    diltiazem (Cardizem) 120 MG tablet, Cardizem 120 mg tablet  Take 1 tablet (120mg) by mouth once Daily., Disp: , Rfl:     ibuprofen (MOTRIN) 600 mg tablet, Take 1 tablet (600 mg total) by mouth every 6 (six) hours as needed for mild pain, Disp: 10 tablet, Rfl: 0    PARoxetine (PAXIL) 30 mg tablet, Take 1 tablet by mouth daily, Disp: , Rfl:     Tadalafil (CIALIS PO), Cialis  1 daily, Disp: , Rfl:     tadalafil (CIALIS) 5 MG tablet, Take 5 mg by mouth, Disp: , Rfl:   [6]   Allergies  Allergen Reactions    Other Hives     Pine and dog hair    Short Ragweed Pollen Ext Rash    Ace Inhibitors Cough

## 2025-06-11 NOTE — PRE-PROCEDURE INSTRUCTIONS
Pre-Surgery Instructions:   Medication Instructions    amLODIPine-valsartan (EXFORGE) 5-160 MG per tablet Hold day of surgery.    butalbital-acetaminophen-caffeine (FIORICET,ESGIC) -40 mg per tablet Uses PRN- DO NOT take day of surgery    pantoprazole (PROTONIX) 40 mg tablet Take day of surgery.    QUEtiapine (SEROquel) 100 mg tablet Take night before surgery    tamsulosin (FLOMAX) 0.4 mg Take day of surgery.   Medication instructions for day of surgery reviewed. Please take all instructed medications with only a sip of water. Please do not take any over the counter (non-prescribed) vitamins or supplements for one week prior to date of surgery.      You will receive a call one business day prior to surgery with an arrival time and hospital directions. If your surgery is scheduled on a Monday, the hospital will be calling you on the Friday prior to your surgery. If you have not heard from anyone by 8pm, please call the hospital supervisor through the hospital  at [phone]. ([place] [phone] or [place] [phone]).    Do not eat or drink anything after midnight the night before your surgery, including candy, mints, lifesavers, or chewing gum. Do not drink alcohol 24hrs before your surgery. Try not to smoke at least 24hrs before your surgery.       Follow the pre surgery showering instructions as listed in the “My Surgical Experience Booklet” or otherwise provided by your surgeon's office. Do not use a blade to shave the surgical area 1 week before surgery. It is okay to use a clean electric clippers up to 24 hours before surgery. Do not apply any lotions, creams, including makeup, cologne, deodorant, or perfumes after showering on the day of your surgery. Do not use dry shampoo, hair spray, hair gel, or any type of hair products.     No contact lenses, eye make-up, or artificial eyelashes. Remove nail polish, including gel polish, and any artificial, gel, or acrylic nails if possible. Remove  all jewelry including rings and body piercing jewelry.     Wear causal clothing that is easy to take on and off. Consider your type of surgery.    Keep any valuables, jewelry, piercings at home. Please bring any specially ordered equipment (sling, braces) if indicated.    Arrange for a responsible person to drive you to and from the hospital on the day of your surgery. Please confirm the visitor policy for the day of your procedure when you receive your phone call with an arrival time.     Call the surgeon's office with any new illnesses, exposures, or additional questions prior to surgery.    Please reference your “My Surgical Experience Booklet” for additional information to prepare for your upcoming surgery.

## 2025-06-18 PROCEDURE — NC001 PR NO CHARGE: Performed by: SURGERY

## 2025-06-18 NOTE — H&P
ASSESSMENT/PLAN:    Assessment & Plan  Trigger finger, left middle finger  Corticosteroid injection provided today        Cubital tunnel syndrome on right     Orders:    Case request operating room: Right cubital tunnel release with possible anterior ulnar nerve transposition with pedicle based adipose flap, Right carpal tunnel release; Standing    Basic metabolic panel; Future    EKG 12 lead; Future  Patient with mild cubital tunnel on the right some nerve conduction study.  Discussed cubital tunnel release at the elbow with possible anterior transposition with pedicle based adipose flap,  Carpal tunnel syndrome on right     Orders:    Case request operating room: Right cubital tunnel release with possible anterior ulnar nerve transposition with pedicle based adipose flap, Right carpal tunnel release; Standing    Basic metabolic panel; Future    EKG 12 lead; Future  Patient has severe right carpal tunnel based on clinical exam with thenar atrophy as well as nerve conduction studies which demonstrate acute on chronic severe carpal tunnel.  Patient inquired as to delaying surgery for approximately a decade we discussed that likely his carpal tunnel would progress to a point where surgery would not be as beneficial.  Benefits and alternatives to surgical intervention discussed in detail        Pre-procedure lab exam     Orders:    Basic metabolic panel; Future    EKG 12 lead; Future     Dupuytren disease  Has Dupuytren's nodules and early cords on the right side  Gopi etiology and natural history of Dupuytren's disease  Anticipate worsening of the condition with any upper extremity surgery  Discussed treatment options in the future if significant contracture occurs           Trigger Finger: The anatomy and physiology of trigger finger was discussed with the patient today in the office.  Edema and increased contact pressure within the flexor tendons at the A1 pulley can cause pain, crepitation, and triggering or  locking of the digit resulting in limitation of function.  Symptoms can occur at anytime but are typically worse in the morning or after a brief rest from repetitive activity.  Treatment options include resting/nighttime MP blocking splints to decrease edema, oral anti-inflammatory medications, home or formal therapy exercises, up to 2 steroid injections within the tendon sheath, or surgical release.  While majority of patients do respond to conservative treatment, up to 20% may require surgical release.      Dupuytren's Disease:  The anatomy and physiology of Dupuytren's disease were discussed with the patient today in the office.  Increased collagen formation (similar to scar tissue formation but without injury) within the interval between the skin volarly and the flexor tendons dorsally can result in pit formation, nodular formation, or eventual cord formation.  These pathologic cords can cause contracture at either the metacarpophalangeal joint, proximal interphalangeal joint, or both.  As the cords progress towards the proximal interphalangeal joint, the neurovascular structures of the finger may become involved within the disease process.  While this is a genetic condition with variable penetrance, repetitive micro trauma may trigger the development of cord formation and thus contracture.  Conservative treatment options including therapy to maintain joint mobility and tabletop testing were discussed.  Other treatments include Xiaflex (collagenase) injection and surgical excision of abnormal cords.      Carpal Tunnel Syndrome: The anatomy and physiology of carpal tunnel syndrome was discussed with the patient today.  Increase pressure localized under the transverse carpal ligament can cause pain, numbness, tingling, or dysesthesias within the median nerve distribution as well as feelings of fatigue, clumsiness, or awkwardness.  These symptoms typically occur at night and worse in the morning upon waking.   Eventually, untreated carpal tunnel syndrome can result in weakness and permanent loss of muscle within the thenar compartment of the hand.  Treatment options were discussed with the patient.  Conservative treatment includes nocturnal resting splints to keep the nerve in a neutral position, ergonomic changes within the work or home environment, activity modification, and tendon gliding exercises. Vitamin B6 one tablet daily over the counter may helpful to reduce symptoms.   Steroid injections within the carpal canal can help a majority of patients, however this is often self-limited in a majority of patients.  Surgical intervention to divide the transverse carpal ligament typically results in a long-lasting relief of the patient's complaints, with the recurrence rate of less than 1%.                                                                                                                                                                                    The patient verbalized understanding of exam findings and treatment plan. We engaged in the shared decision-making process and treatment options were discussed at length with the patient. Surgical and conservative management discussed today along with risks and benefits. The patient elected to receive a trigger finger injection into the left long finger and discuss surgical intervention, in the form of right cubital and carpal tunnel release, for his right hand symptoms.      Diagnoses and all orders for this visit:     Trigger finger, left middle finger     Cubital tunnel syndrome on right  -     Case request operating room: Right cubital tunnel release with possible anterior ulnar nerve transposition with pedicle based adipose flap, Right carpal tunnel release; Standing  -     Basic metabolic panel; Future  -     EKG 12 lead; Future  -     Case request operating room: Right cubital tunnel release with possible anterior ulnar nerve transposition with pedicle  based adipose flap, Right carpal tunnel release     Carpal tunnel syndrome on right  -     Case request operating room: Right cubital tunnel release with possible anterior ulnar nerve transposition with pedicle based adipose flap, Right carpal tunnel release; Standing  -     Basic metabolic panel; Future  -     EKG 12 lead; Future  -     Case request operating room: Right cubital tunnel release with possible anterior ulnar nerve transposition with pedicle based adipose flap, Right carpal tunnel release     Pre-procedure lab exam  -     Basic metabolic panel; Future  -     EKG 12 lead; Future     Other orders  -     Hand/upper extremity injection: L long A1  -     Nursing Communication Warmimg Interventions Implemented; Standing  -     Nursing Communication CHG bath, have staff wash entire body (neck down) per pre-op bathing protocol. Routine, evening prior to, and day of surgery.; Standing  -     Nursing Communication Swab both nares with Povidone-Iodine solution, EXCLUDE if patient has shellfish/Iodine allergy, and replace with nasal alcohol swabstick. Routine, day of surgery, on call to OR; Standing  -     chlorhexidine (PERIDEX) 0.12 % oral rinse 15 mL  -     Void on call to OR; Standing  -     Insert peripheral IV; Standing  -     Diet NPO; Sips with meds; Standing  -     sodium chloride 0.9 % infusion  -     ceFAZolin (ANCEF) 2,000 mg in sodium chloride 0.9 % 50 mL IVPB  -     Apply Sequential Compression Device; Standing           Follow Up:  No follow-ups on file.     To Do Next Visit:  Re-evaluation of current issue        ____________________________________________________________________________________________________________________________________________        CHIEF COMPLAINT:       Chief Complaint   Patient presents with    Left Hand - Locking, Triggering       Left middle finger         SUBJECTIVE:  Julito Mac is a 60 y.o. year old RHD male who presents for initial evaluation of his left long  finger. He complains of pain at the volar aspect of the wrist, which radiates into the long finger of the left hand. Endorses catching, which occasionally requires manual release. Endorses numbness/tingling into the left long finger, which is most prominent when the finger catches.      Additionally, he mentions that he experiences numbness and tingling into the right hand, most prominently affecting the second through fourth digits. He has previously had an EMG done on 3/21/2025.      Pain/symptom timing:  Worse during the day when active  Pain/symptom context:  Worse with activites and work  Pain/symptom modifying factors:  Rest makes better, activities make worse  Pain/symptom associated signs/symptoms: numbness/tingling      Prior treatment   NSAIDs No   Injections No   Bracing/Orthotics No    Physical Therapy No      I have personally reviewed all the relevant PMH, PSH, SH, FH, Medications and allergies        PAST MEDICAL HISTORY:       Past Medical History:   Diagnosis Date    Anxiety      Chronic pain disorder      Colon polyp      GERD (gastroesophageal reflux disease)      Hypertension      PTSD (post-traumatic stress disorder)           PAST SURGICAL HISTORY:        Past Surgical History:   Procedure Laterality Date    ANKLE SURGERY        CERVICAL SPINE SURGERY        CHOLECYSTECTOMY        COLONOSCOPY        ELBOW SURGERY        GA ESOPHAGOGASTRODUODENOSCOPY TRANSORAL DIAGNOSTIC N/A 2016     Procedure: EGD AND COLONOSCOPY;  Surgeon: Chidi Kothari III, MD;  Location: [facility];  Service: Gastroenterology    THUMB ARTHROSCOPY Left           FAMILY HISTORY:        Family History   Problem Relation Age of Onset    Hypertension Mother           SOCIAL HISTORY:  Social History            Tobacco Use    Smoking status: Former       Current packs/day: 0.00       Types: Cigarettes       Quit date: 2018       Years since quittin.9    Smokeless tobacco: Never    Tobacco comments:       quit  smoking @2019   Vaping Use    Vaping status: Some Days    Last attempt to quit: 5/27/2019    Substances: No Nicotine (in past), THC, CBD   Substance Use Topics    Alcohol use: Yes       Comment: 3x week    Drug use: Yes       Frequency: 7.0 times per week       Types: Marijuana       Comment: capsules and edibles         MEDICATIONS:     Current Outpatient Medications:     amLODIPine-benazepril (LOTREL) 10-20 MG per capsule, Take 1 capsule by mouth daily, Disp: , Rfl:     amLODIPine-valsartan (EXFORGE) 5-160 MG per tablet, Take 1 tablet by mouth daily, Disp: , Rfl:     ascorbic acid (VITAMIN C) 1000 MG tablet, Vitamin C 1,000 mg tablet  Take 1 tablet (1,000mg) by mouth once Daily., Disp: , Rfl:     B Complex Vitamins (VITAMIN B COMPLEX 100 IJ), vitamin B complex  Take 1 capsule by mouth once Daily., Disp: , Rfl:     butalbital-acetaminophen-caffeine (FIORICET,ESGIC) -40 mg per tablet, butalbital-acetaminophen-caffeine 50 mg-325 mg-40 mg tablet, Disp: , Rfl:     chlorhexidine (PERIDEX) 0.12 % solution, Apply 15 mL to the mouth or throat 2 (two) times a day, Disp: 120 mL, Rfl: 0    Cholecalciferol 25 MCG (1000 UT) tablet, Take 1,000 Units by mouth daily, Disp: , Rfl:     COVID-19 At Home Antigen Test KIT, , Disp: , Rfl:     COVID-19 At Home Antigen Test KIT, Take 1 capsule by mouth daily, Disp: , Rfl:     diltiazem (Cardizem) 120 MG tablet, Cardizem 120 mg tablet  Take 1 tablet (120mg) by mouth once Daily., Disp: , Rfl:     naproxen sodium (Aleve) 220 MG tablet, Aleve 220 mg tablet  Take 1 tablet (220mg) by mouth As Needed. PRN, Disp: , Rfl:     pantoprazole (PROTONIX) 40 mg tablet, TAKE ONE TABLET BY MOUTH ONCE DAILY , Disp: 90 tablet, Rfl: 0    pantoprazole (PROTONIX) 40 mg tablet, Take 40 mg by mouth daily, Disp: , Rfl:     pantoprazole (PROTONIX) 40 mg tablet, Take 1 tablet by mouth daily, Disp: , Rfl:     PARoxetine (PAXIL) 30 mg tablet, Take 1 tablet by mouth daily, Disp: , Rfl:     QUEtiapine (SEROquel)  100 mg tablet, Take 100 mg by mouth daily at bedtime, Disp: , Rfl:     QUEtiapine (SEROquel) 100 mg tablet, Take 100 mg by mouth, Disp: , Rfl:     QUEtiapine (SEROquel) 100 mg tablet, Seroquel, Disp: , Rfl:     Tadalafil (CIALIS PO), Cialis  1 daily, Disp: , Rfl:     tadalafil (CIALIS) 5 MG tablet, Take 5 mg by mouth, Disp: , Rfl:     tamsulosin (FLOMAX) 0.4 mg, Take 0.4 mg by mouth, Disp: , Rfl:     cyclobenzaprine (FLEXERIL) 10 mg tablet, Take 1 tablet (10 mg total) by mouth 3 (three) times a day for 3 days, Disp: 9 tablet, Rfl: 0    dicyclomine (BENTYL) 20 mg tablet, Take by mouth 3 times a day as needed x 7 days (Patient not taking: Reported on 5/8/2025), Disp: , Rfl:     gabapentin (NEURONTIN) 300 mg capsule, gabapentin 300 mg capsule (Patient not taking: Reported on 5/8/2025), Disp: , Rfl:     Levomilnacipran HCl ER (FETZIMA) 40 MG extended release capsule, Daily (Patient not taking: Reported on 5/8/2025), Disp: , Rfl:     Magnesium Gluconate 550 MG TABS, Take 30 mg by mouth 2 (two) times a day (Patient not taking: Reported on 5/8/2025), Disp: , Rfl:     Oregano 1500 MG CAPS, Take by mouth Takes now and then (Patient not taking: Reported on 12/22/2022), Disp: , Rfl:     OxyCODONE HCl (OXYCONTIN PO), Take by mouth (Patient not taking: Reported on 5/8/2025), Disp: , Rfl:     PARoxetine (PAXIL) 10 mg tablet, Take 10 mg by mouth every morning (Patient not taking: Reported on 5/8/2025), Disp: , Rfl:     PARoxetine (PAXIL) 10 mg tablet, Take 10 mg by mouth (Patient not taking: Reported on 5/8/2025), Disp: , Rfl:     PARoxetine (PAXIL) 40 MG tablet, Take by mouth (Patient not taking: Reported on 5/8/2025), Disp: , Rfl:     zolpidem (AMBIEN) 10 mg tablet, zolpidem 10 mg tablet (Patient not taking: Reported on 5/8/2025), Disp: , Rfl:      ALLERGIES:        Allergies   Allergen Reactions    Other Hives       Pine and dog hair    Short Ragweed Pollen Ext Rash    Ace Inhibitors Cough            REVIEW OF SYSTEMS:  Review  of Systems   Constitutional:  Negative for chills, fatigue and fever.   HENT:  Negative for drooling, ear discharge, facial swelling, hearing loss, nosebleeds, rhinorrhea and sneezing.    Eyes:  Negative for pain, discharge, redness and itching.   Respiratory:  Negative for chest tightness, shortness of breath and wheezing.    Cardiovascular:  Negative for chest pain and palpitations.   Gastrointestinal:  Negative for abdominal pain, constipation and diarrhea.   Endocrine: Negative for cold intolerance and heat intolerance.   Genitourinary:  Negative for dysuria and flank pain.   Musculoskeletal:  Negative for arthralgias, joint swelling and myalgias.   Skin:  Negative for rash.   Neurological:  Negative for dizziness, tremors, light-headedness, numbness and headaches.   Psychiatric/Behavioral:  Negative for self-injury and suicidal ideas.          VITALS:  There were no vitals filed for this visit.     LABS:  None to review.      _____________________________________________________  PHYSICAL EXAMINATION:  General: well developed and well nourished, alert, oriented times 3, and appears comfortable  Psychiatric: Normal  HEENT: Normocephalic, Atraumatic Trachea Midline, No torticollis  Pulmonary: No audible wheezing or respiratory distress   Abdomen/GI: Non tender, non distended   Cardiovascular: No pitting edema, 2+ radial pulse   Skin: No masses, erythema, lacerations, fluctation, ulcerations  Neurovascular: Sensation Intact to the Median, Ulnar, Radial Nerve, Motor Intact to the Median, Ulnar, Radial Nerve, and Pulses Intact  Musculoskeletal: Normal, except as noted in detailed exam and in HPI.        MUSCULOSKELETAL EXAMINATION:  LEFT HAND:  Left long finger:  Positive palpable nodule over the A1 pulley.  Positive tenderness to palpation over A1 pulley. Positive catching. Positive clicking.    Left ring finger: minimal contracture present at the ring finger     RIGHT HAND:   There is no swelling present. There is  no ecchymosis noted.    Thenar atrophy appreciated.  Non-TTP throughout the elbow, wrist, and digits.  + Tinel's at the carpal tunnel, + Phalen's   + Tinel's at the cubital tunnel        ___________________________________________________  STUDIES REVIEWED:     I personally reviewed and interpreted EMG nerve conduction study:  EMG, bilateral upper extremities, 3/21/2025, demonstrate evidence of acute to subacute, severe carpal tunnel and cubital tunnel syndrome on the right side. There is, also, evidence of mild carpal tunnel on the left side.         LABS REVIEWED:  HgA1c:         Lab Results   Component Value Date     HGBA1C 5.6 12/19/2023      BMP:         Lab Results   Component Value Date     CALCIUM 9.5 10/18/2024     K 4.2 10/18/2024     CO2 29 10/18/2024      10/18/2024     BUN 13 10/18/2024     CREATININE 0.97 10/18/2024

## 2025-06-20 ENCOUNTER — TELEPHONE (OUTPATIENT)
Dept: OBGYN CLINIC | Facility: HOSPITAL | Age: 60
End: 2025-06-20

## 2025-06-20 ENCOUNTER — HOSPITAL ENCOUNTER (OUTPATIENT)
Age: 60
Setting detail: OUTPATIENT SURGERY
Discharge: HOME | End: 2025-06-20
Attending: SURGERY | Admitting: SURGERY
Payer: MEDICARE

## 2025-06-20 ENCOUNTER — ANESTHESIA (OUTPATIENT)
Age: 60
End: 2025-06-20
Payer: MEDICARE

## 2025-06-20 VITALS
DIASTOLIC BLOOD PRESSURE: 84 MMHG | HEIGHT: 67 IN | OXYGEN SATURATION: 99 % | RESPIRATION RATE: 14 BRPM | BODY MASS INDEX: 27.18 KG/M2 | HEART RATE: 68 BPM | TEMPERATURE: 97.5 F | SYSTOLIC BLOOD PRESSURE: 132 MMHG | WEIGHT: 173.2 LBS

## 2025-06-20 DIAGNOSIS — G56.01 CARPAL TUNNEL SYNDROME ON RIGHT: ICD-10-CM

## 2025-06-20 DIAGNOSIS — G56.21 CUBITAL TUNNEL SYNDROME ON RIGHT: Primary | ICD-10-CM

## 2025-06-20 DIAGNOSIS — Z47.89 AFTERCARE FOLLOWING SURGERY OF THE MUSCULOSKELETAL SYSTEM: ICD-10-CM

## 2025-06-20 PROCEDURE — 64721 CARPAL TUNNEL SURGERY: CPT | Performed by: PHYSICIAN ASSISTANT

## 2025-06-20 PROCEDURE — 64718 REVISE ULNAR NERVE AT ELBOW: CPT | Performed by: SURGERY

## 2025-06-20 PROCEDURE — 64721 CARPAL TUNNEL SURGERY: CPT | Performed by: SURGERY

## 2025-06-20 PROCEDURE — 64718 REVISE ULNAR NERVE AT ELBOW: CPT | Performed by: PHYSICIAN ASSISTANT

## 2025-06-20 RX ORDER — CEFAZOLIN SODIUM 2 G/50ML
2000 SOLUTION INTRAVENOUS ONCE
Status: COMPLETED | OUTPATIENT
Start: 2025-06-20 | End: 2025-06-20

## 2025-06-20 RX ORDER — OXYCODONE HYDROCHLORIDE 5 MG/1
5 TABLET ORAL EVERY 4 HOURS PRN
Qty: 10 TABLET | Refills: 0 | Status: SHIPPED | OUTPATIENT
Start: 2025-06-20 | End: 2025-07-01 | Stop reason: SDUPTHER

## 2025-06-20 RX ORDER — BUPIVACAINE HYDROCHLORIDE 2.5 MG/ML
INJECTION, SOLUTION EPIDURAL; INFILTRATION; INTRACAUDAL; PERINEURAL
Status: COMPLETED | OUTPATIENT
Start: 2025-06-20 | End: 2025-06-20

## 2025-06-20 RX ORDER — OXYCODONE AND ACETAMINOPHEN 5; 325 MG/1; MG/1
1 TABLET ORAL EVERY 4 HOURS PRN
Qty: 10 TABLET | Refills: 0 | Status: SHIPPED | OUTPATIENT
Start: 2025-06-20 | End: 2025-06-20

## 2025-06-20 RX ORDER — PROPOFOL 10 MG/ML
INJECTION, EMULSION INTRAVENOUS AS NEEDED
Status: DISCONTINUED | OUTPATIENT
Start: 2025-06-20 | End: 2025-06-20

## 2025-06-20 RX ORDER — SODIUM CHLORIDE 9 MG/ML
75 INJECTION, SOLUTION INTRAVENOUS CONTINUOUS
Status: DISCONTINUED | OUTPATIENT
Start: 2025-06-20 | End: 2025-06-20 | Stop reason: HOSPADM

## 2025-06-20 RX ORDER — MAGNESIUM HYDROXIDE 1200 MG/15ML
LIQUID ORAL AS NEEDED
Status: DISCONTINUED | OUTPATIENT
Start: 2025-06-20 | End: 2025-06-20 | Stop reason: HOSPADM

## 2025-06-20 RX ORDER — ONDANSETRON 2 MG/ML
4 INJECTION INTRAMUSCULAR; INTRAVENOUS ONCE AS NEEDED
Status: DISCONTINUED | OUTPATIENT
Start: 2025-06-20 | End: 2025-06-20 | Stop reason: HOSPADM

## 2025-06-20 RX ORDER — FENTANYL CITRATE/PF 50 MCG/ML
25 SYRINGE (ML) INJECTION
Status: DISCONTINUED | OUTPATIENT
Start: 2025-06-20 | End: 2025-06-20 | Stop reason: HOSPADM

## 2025-06-20 RX ORDER — HYDROCODONE BITARTRATE AND ACETAMINOPHEN 5; 325 MG/1; MG/1
1 TABLET ORAL EVERY 6 HOURS PRN
Qty: 10 TABLET | Refills: 0 | Status: SHIPPED | OUTPATIENT
Start: 2025-06-20 | End: 2025-06-20

## 2025-06-20 RX ORDER — FENTANYL CITRATE 50 UG/ML
INJECTION, SOLUTION INTRAMUSCULAR; INTRAVENOUS
Status: COMPLETED | OUTPATIENT
Start: 2025-06-20 | End: 2025-06-20

## 2025-06-20 RX ORDER — MIDAZOLAM HYDROCHLORIDE 2 MG/2ML
INJECTION, SOLUTION INTRAMUSCULAR; INTRAVENOUS
Status: COMPLETED | OUTPATIENT
Start: 2025-06-20 | End: 2025-06-20

## 2025-06-20 RX ORDER — CHLORHEXIDINE GLUCONATE ORAL RINSE 1.2 MG/ML
15 SOLUTION DENTAL ONCE
Status: COMPLETED | OUTPATIENT
Start: 2025-06-20 | End: 2025-06-20

## 2025-06-20 RX ORDER — HYDROCODONE BITARTRATE AND ACETAMINOPHEN 5; 325 MG/1; MG/1
1 TABLET ORAL EVERY 6 HOURS PRN
Refills: 0 | Status: DISCONTINUED | OUTPATIENT
Start: 2025-06-20 | End: 2025-06-20 | Stop reason: HOSPADM

## 2025-06-20 RX ADMIN — FENTANYL CITRATE 100 MCG: 50 INJECTION INTRAMUSCULAR; INTRAVENOUS at 09:10

## 2025-06-20 RX ADMIN — PROPOFOL 100 MCG/KG/MIN: 10 INJECTION, EMULSION INTRAVENOUS at 09:28

## 2025-06-20 RX ADMIN — PROPOFOL 100 MCG/KG/MIN: 10 INJECTION, EMULSION INTRAVENOUS at 10:10

## 2025-06-20 RX ADMIN — BUPIVACAINE HYDROCHLORIDE 20 ML: 2.5 INJECTION, SOLUTION EPIDURAL; INFILTRATION; INTRACAUDAL; PERINEURAL at 09:15

## 2025-06-20 RX ADMIN — CEFAZOLIN SODIUM 2000 MG: 2 SOLUTION INTRAVENOUS at 09:27

## 2025-06-20 RX ADMIN — BUPIVACAINE 10 ML: 13.3 INJECTION, SUSPENSION, LIPOSOMAL INFILTRATION at 09:15

## 2025-06-20 RX ADMIN — PROPOFOL 70 MG: 10 INJECTION, EMULSION INTRAVENOUS at 09:27

## 2025-06-20 RX ADMIN — SODIUM CHLORIDE 75 ML/HR: 0.9 INJECTION, SOLUTION INTRAVENOUS at 08:28

## 2025-06-20 RX ADMIN — CHLORHEXIDINE GLUCONATE 15 ML: 1.2 SOLUTION ORAL at 08:29

## 2025-06-20 RX ADMIN — MIDAZOLAM 2 MG: 1 INJECTION INTRAMUSCULAR; INTRAVENOUS at 09:05

## 2025-06-20 NOTE — OP NOTE
OPERATIVE REPORT  PATIENT NAME: Julito aMc  :  1965  MRN: [id number]  Pt Location: [facility]    SURGERY DATE: 25    Surgeons and Role:     * Praveen Du MD - Primary     * Yohana Murphy PA-C - Assisting    Pre-Op Diagnosis:  Cubital tunnel syndrome on right [G56.21]  Carpal tunnel syndrome on right [G56.01]    Post-Op Diagnosis Codes:     * Cubital tunnel syndrome on right [G56.21]     * Carpal tunnel syndrome on right [G56.01]    Procedure(s):  Right cubital tunnel release (Right)  Right carpal tunnel release (Right)    Specimen(s):  No specimens collected during this procedure.    Estimated Blood Loss:   Minimal    Anesthesia Type:   Regional with Sedation    IMPLANTS:  * No implants in log *    PERIOPERATIVE ANTIBIOTICS:    cefazolin, 2 grams    Tourniquet Time: 30 min  large hemaclear           Operative Indications:  The patient has a history of Carpal Tunnel Syndrome  right and Cubital Tunnel Syndrome  right that was recalcitrant to conservative management.  The decision was made to bring the patient to the operating room for Open Carpal Tunnel Release  right and Cubital Tunnel Release  right.  Risks of the procedure were explained which include, but are not limited to bleeding; infection; damage to nerves, arteries,veins, tendons; scar; pain; need for reoperation; failure to give desired result; and risks of anaesthesia.  All questions were answered to satisfaction and they were willing to proceed.         Operative Findings:  Hypertrophied transverse carpal ligament  Ulnar nerve compression at Pizarro's fascia and deep fascia of the FCU    Complications:   None    Procedure and Technique:  After the patient, site, and procedure were identified, the patient was brought into the operating room in a supine position.  Regional anaesthesia and sedation were provided.  The  right upper extremity was then prepped and drapped in a normal, sterile, orthopedic fashion.    An anterior approach to  the carpal tunnel was undertaken. A 2 cm incision was made in line with the fourth digit, proximal to Melgar's line.  The skin and the subcutaneous tissue were sharply incised.  Under loupe magnification, dissection was carried down to the palmar fascia and it was incised. The transverse carpal ligament was visualized and  Released distally with a #64 blade. A freer was was passed above and below the TCL in a retrograde fashion, freeing up any adhesions. A Knife Lite was used to release the proximal portion of the transverse carpal ligament under direct visualization.  Distally the superficial arch was identified.  A complete release was carried out and exploration of the carpal canal revealed no significant tenosynovitis. The median nerve and its branches were intact.      A 6 cm Incision was made with a 15 blade between medial epicondyle and olecranon.  Two branches of the MABC were identified distally and protected.  The ulnar nerve was identified at the level of the cubital tunnel.  The nerve was not found to be subluxed.  The Ulnar nerve was traced 1st traced proximally.  Proximal release was performed under direct visualization  the overlying fascia was released with a scissor up to the level of the intermuscular septum which was incised.  Next dissection was taken proximally both with a scissor and with a 64 blade.  Care was taken preserve any crossing branches of the medial antebrachial cutaneous nerve.  Decompression was taken around the olecranon, Pizarro's fascia was released dissection was then taken distal to the olecranon, to the FCU.  The superficial layer of the  FCU fascia was released with a knife and the 2 bellies of the FCU spread gently with a scissor.  The deep fascia of the FCU was identified and with the nerve protected all times incised.  Release was taken just past the level of the 1st motor branch to the FCU.  Once a complete release was verified,  the Elbow was taken through a full range  of motion and the nerve was found to be stable with no subluxation. After the release, it was appreciated that the nerve had been significantly constricted at the level of Pizarro's fascia and deep FCU fascia with nerve hyperemia and flattening the tourniquet was brought down and hemostasis was obtained. The wound was copiously irrigated and closed in a layered fashion with 3-0 monocryl  for deep dermal .       At the completion of the procedure, hemostasis was obtained with cautery and direct pressure.  The wounds were copiously irrigated with sterile solution.  The wounds were closed with Prolene, Monocryl, and Steri-strips.  Sterile dressings were applied, including Xeroform, gauze, tweeners, webril, ACE.  Please note, all sponge, needle, and instrument counts were correct prior to closure.  Loupe magnification was utilized.  The patient tolerated the procedure well.     I was present for the entire procedure., A qualified resident physician was not available., and A physician assistant was required during the procedure for retraction, tissue handling, dissection and suturing.    Patient Disposition:  PACU     SIGNATURE: Praveen Du MD  DATE: 06/20/25  TIME: 10:12 AM

## 2025-06-20 NOTE — ANESTHESIA PREPROCEDURE EVALUATION
Procedure:  Right cubital tunnel release with possible anterior ulnar nerve transposition with pedicle based adipose flap (Right: Elbow)  Right carpal tunnel release (Right: Wrist)    Had prior thumb surgery and C-spine surgery as well as recent ear surgery under [facility] with no anesthesia complication     Hears better out of Lt. Ear     Relevant Problems   CARDIO   (+) Essential hypertension      GI/HEPATIC   (+) Gastroesophageal reflux disease without esophagitis      MUSCULOSKELETAL   (+) Lumbago with sciatica   (+) Lumbar back pain      NEURO/PSYCH   (+) Chronic pain disorder   (+) Generalized anxiety disorder   (+) PTSD (post-traumatic stress disorder)   (+) Recurrent major depressive disorder, in partial remission (HCC)   (+) Stress headaches        Physical Exam    Airway     Mallampati score: II  TM Distance: >3 FB  Neck ROM: full  Mouth opening: >= 4 cm      Cardiovascular  Cardiovascular exam normal    Dental   No notable dental hx     Pulmonary  Pulmonary exam normal     Neurological  - normal exam    Other Findings  post-pubertal.      Anesthesia Plan  ASA Score- 2     Anesthesia Type- regional and IV sedation with anesthesia with ASA Monitors.         Additional Monitors:     Airway Plan: natural airway.           Plan Factors-Exercise tolerance (METS): >4 METS.    Chart reviewed. EKG reviewed.  Existing labs reviewed. Patient summary reviewed.    Patient is a current smoker.  Patient instructed to abstain from smoking on day of procedure. Patient did not smoke on day of surgery.    Obstructive sleep apnea risk education given perioperatively.        Induction-     Postoperative Plan- .   Monitoring Plan - Monitoring plan - standard ASA monitoring  Post Operative Pain Plan - peripheral nerve block and multimodal analgesia        Informed Consent- Anesthetic plan and risks discussed with patient.  I personally reviewed this patient with the CRNA. Discussed and agreed on the Anesthesia Plan with the  CRNA..      NPO Status:  Vitals Value Taken Time   Date of last liquid 06/20/25 06/20/25 08:24   Time of last liquid 0600 06/20/25 08:24   Date of last solid 06/19/25 06/20/25 08:24   Time of last solid 2200 06/20/25 08:24

## 2025-06-20 NOTE — ANESTHESIA POSTPROCEDURE EVALUATION
Post-Op Assessment Note    CV Status:  Stable    Pain management: adequate       Mental Status:  Alert and awake   Hydration Status:  Euvolemic   PONV Controlled:  Controlled   Airway Patency:  Patent  Two or more mitigation strategies used for obstructive sleep apnea   Post Op Vitals Reviewed: Yes    No anethesia notable event occurred.    Staff: Anesthesiologist           Last Filed PACU Vitals:  Vitals Value Taken Time   Temp 97.7 °F (36.5 °C) 06/20/25 10:27   Pulse 67 06/20/25 10:52   /72 06/20/25 10:45   Resp 12 06/20/25 10:52   SpO2 98 % 06/20/25 10:52   Vitals shown include unfiled device data.    Modified [place]:     Vitals Value Taken Time   Activity 2 06/20/25 10:45   Respiration 2 06/20/25 10:45   Circulation 2 06/20/25 10:45   Consciousness 1 06/20/25 10:45   Oxygen Saturation 2 06/20/25 10:45     Modified [place] Score: 9

## 2025-06-20 NOTE — DISCHARGE INSTR - AVS FIRST PAGE
Post Operative Instructions    You have had surgery on your arm today, please read and follow the information below:  Elevate your hand above your elbow during the next 24-48 hours to help with swelling.  Place your hand and arm over your head with motion at your shoulder three times a day.  Do not apply any cream/ointment/oil to your incisions including antibiotics.  Do not soak your hands in standing water (dishwater, tubs, Jacuzzi's, pools, etc.) until given permission (typically 2-3 weeks after injury)    Call the office if you notice any:  Increased numbness or tingling of your hand or fingers that is not relieved with elevation.  Increasing pain that is not controlled with medication.  Difficulty chewing, breathing, swallowing.  Chest pains or shortness of breath.  Fever over 101.4 degrees.    Bandage: Please keep bandages clean and dry. Remove bandage after 5 days. Once bandages are removed you may wash hands with soap and water. Short showers are okay as well, but please avoid soaking the hand as described above (ie no pools, baths, dirty dish water, hot tubs, ocean/lake water, etc). Sutures and steri strips will be removed in the office at your first follow up visit, please do not remove them yourself. Steri strips may fall off on their own, this is okay.    Please do NOT put any topical agents on the surgical wound including neosporin, peroxide, tea tree oil, vitamin E, etc. as these can delay wound healing.    Motion: Move fingers into a fist 5 times a day, DO NOT move any splinted fingers.    Weight bearing status: Avoid heavy lifting (>5 pounds) with the extremity that was operated on until follow up appointment. Normal activities of daily living are OK.    Ice: Ice for 10 minutes every hour as needed for swelling x 24 hours.    Sling: Please use your sling while your arm is numb from the block. When your arm is FULLY awake again, you no longer need this and may use your sling as needed for comfort.  While using the sling, make sure to move your shoulder throughout the day to prevent stiffness here.     Pain medication:   Naproxen 220 mg two times a day (this is over the counter!)  *do not take this medication if you were told by your doctor that you cannot take anti-inflammatories or NSAIDS  Tylenol Extended Release 650 mg every 8 hours (this is over the counter!)   Norco/Hydrocodone one tab every 6 hours ONLY AS NEEDED for severe pain        Follow-up Appointment: 10-14 days with Dr Du        Please call the office if you have any questions or concerns regarding your post-operative care.

## 2025-06-20 NOTE — INTERVAL H&P NOTE
H&P reviewed. After examining the patient I find no changes in the patients condition since the H&P had been written.    Vitals:    06/20/25 0823   BP: 140/95   Pulse: 79   Resp: 16   Temp: (!) 97.2 °F (36.2 °C)   SpO2: 99%

## 2025-06-20 NOTE — TELEPHONE ENCOUNTER
Oxy script sent to pharmacy. Please be aware, patient specifically asked for percocet or oxy by name at the hospital today because Vicodin doesn't work for him. If he cannot take tylenol he should be aware that percocet contains tylenol and he should no longer request that medication.

## 2025-06-20 NOTE — TELEPHONE ENCOUNTER
Patient called back to the Rx Refill Line stating that he is due to take this medication and he cannot as an alternative has not been sent in. Patient cannot take anything with Acetaminophen in it. Please address as soon as possible. Patient stated he would be calling back around 5:30 PM to follow up.

## 2025-06-20 NOTE — TELEPHONE ENCOUNTER
Caller: pt     Doctor: Dr. Du     Reason for call: pt is PO 6/20 cubital tunnel. Hs is requesting medication change as he cannot take tylenol    Pharm: [facility] on file     Call back#: Phone: [phone]

## 2025-06-20 NOTE — ANESTHESIA POSTPROCEDURE EVALUATION
Post-Op Assessment Note    CV Status:  Stable  Pain Score: 0    Pain management: adequate       Mental Status:  Awake   Hydration Status:  Euvolemic   PONV Controlled:  Controlled   Airway Patency:  Patent  Two or more mitigation strategies used for obstructive sleep apnea   Post Op Vitals Reviewed: Yes    No anethesia notable event occurred.    Staff: CRNA           Last Filed PACU Vitals:  Vitals Value Taken Time   Temp 97.7    Pulse 79    /72    Resp 12    SpO2 97        Modified [place]:     Vitals Value Taken Time   Activity 2 06/20/25 10:27   Respiration 1 06/20/25 10:27   Circulation 2 06/20/25 10:27   Consciousness 1 06/20/25 10:27   Oxygen Saturation 2 06/20/25 10:27     Modified [place] Score: 8

## 2025-06-20 NOTE — ANESTHESIA PROCEDURE NOTES
Peripheral Block    Patient location during procedure: holding area  Start time: 6/20/2025 9:00 AM  Reason for block: at surgeon's request and post-op pain management  Staffing  Performed by: Ritchie Wilcox MD  Authorized by: Ritchie Wilcox MD    Preanesthetic Checklist  Completed: patient identified, IV checked, site marked, risks and benefits discussed, surgical consent, monitors and equipment checked, pre-op evaluation and timeout performed  Peripheral Block  Prep: ChloraPrep  Patient monitoring: frequent blood pressure checks, continuous pulse oximetry and heart rate  Block type: Supraclavicular (+ Intercostobrachial)  Laterality: right  Injection technique: single-shot  Procedures: ultrasound guided, Ultrasound guidance required for the procedure to increase accuracy and safety of medication placement and decrease risk of complications.  Ultrasound permanent image saved  midazolam (VERSED) injection 0.5 mg - Intravenous   2 mg - 6/20/2025 9:05:00 AM  fentanyl citrate (PF) 100 MCG/2ML 50 mcg - Intravenous   100 mcg - 6/20/2025 9:10:00 AM  bupivacaine liposomal (EXPAREL) 1.3 % injection 20 mL - Perineural   10 mL - 6/20/2025 9:15:00 AM  bupivacaine (PF) (MARCAINE) 0.25 % injection 20 mL - Perineural   20 mL - 6/20/2025 9:15:00 AM  Needle  Needle type: Stimuplex   Needle gauge: 22 G  Needle length: 2 in  Needle localization: anatomical landmarks and ultrasound guidance  Assessment  Injection assessment: incremental injection, frequent aspiration, injected with ease, negative aspiration, negative for heart rate change, no paresthesia on injection, no symptoms of intraneural/intravenous injection and needle tip visualized at all times  Paresthesia pain: none  Post-procedure:  site cleaned  patient tolerated the procedure well with no immediate complications

## 2025-06-30 ENCOUNTER — TELEPHONE (OUTPATIENT)
Age: 60
End: 2025-06-30

## 2025-06-30 NOTE — TELEPHONE ENCOUNTER
Caller: Patient     Doctor: Dr. Du     Reason for call: Asked for a refill on oxycodone     [id number] - [place] - [address]  [address] [address]  Phone: [phone]  Fax: [phone]  URIEL #: --

## 2025-07-01 DIAGNOSIS — G56.01 CARPAL TUNNEL SYNDROME ON RIGHT: ICD-10-CM

## 2025-07-01 DIAGNOSIS — Z47.89 AFTERCARE FOLLOWING SURGERY OF THE MUSCULOSKELETAL SYSTEM: ICD-10-CM

## 2025-07-01 DIAGNOSIS — G56.21 CUBITAL TUNNEL SYNDROME ON RIGHT: ICD-10-CM

## 2025-07-01 RX ORDER — OXYCODONE HYDROCHLORIDE 5 MG/1
5 TABLET ORAL
Qty: 5 TABLET | Refills: 0 | Status: SHIPPED | OUTPATIENT
Start: 2025-07-01 | End: 2025-07-11

## 2025-07-02 ENCOUNTER — OFFICE VISIT (OUTPATIENT)
Dept: OBGYN CLINIC | Facility: CLINIC | Age: 60
End: 2025-07-02

## 2025-07-02 VITALS — HEIGHT: 67 IN | BODY MASS INDEX: 27.18 KG/M2 | WEIGHT: 173.2 LBS

## 2025-07-02 DIAGNOSIS — G56.01 CARPAL TUNNEL SYNDROME ON RIGHT: Primary | ICD-10-CM

## 2025-07-02 DIAGNOSIS — G56.21 CUBITAL TUNNEL SYNDROME ON RIGHT: ICD-10-CM

## 2025-07-02 PROCEDURE — 99024 POSTOP FOLLOW-UP VISIT: CPT | Performed by: SURGERY

## 2025-07-02 NOTE — PROGRESS NOTES
Assessment/Plan:  Patient ID: Julito Mac 60 y.o. male   Surgery: Right cubital tunnel release - Right and Right carpal tunnel release - Right  Date of Surgery: 6/20/2025      Assessment & Plan  Carpal tunnel syndrome on right  Cubital tunnel syndrome on right  Sutures removed today, wounds are well healed   He does have some pain and sensitivity, particularly at the elbow. There is a small seroma which should resolve with some compression and time. Ace wrap provided today   Carpal tunnel gel sleeve provided  Discussed with the patient that his pain and sensitivity will take time to resolve. Therapy will help, he has an appointment coming up   Activity as tolerated              Follow Up:  PRN    To Do Next Visit:         CHIEF COMPLAINT:  Chief Complaint   Patient presents with    Right Wrist - Post-op         SUBJECTIVE:  Julito Mac is a 60 y.o. year old male who presents for follow up after Right cubital tunnel release - Right and Right carpal tunnel release - Right. Today patient has pain at the elbow and hand and is unsure if he has significant change in symptoms. He has a lot of hypersensitivity at the elbow when he bumps it. No fever or chills. He is starting therapy soon.        PHYSICAL EXAMINATION:  General: well developed and well nourished, alert, oriented times 3, and appears comfortable  Psychiatric: Normal    MUSCULOSKELETAL EXAMINATION:  Incision: Clean, dry, intact  Surgery Site: normal, no evidence of infection . Small seroma at the elbow surgical site  Range of Motion: opposition intact and full composite fist possible  Neurovascular status: Neuro intact, good cap refill  Activity Restrictions: No restrictions  Done today: Sutures out        STUDIES REVIEWED:  None      LABS REVIEWED:    HgA1c:   Lab Results   Component Value Date    HGBA1C 5.6 12/19/2023     BMP:   Lab Results   Component Value Date    CALCIUM 8.9 05/30/2025    K 3.9 05/30/2025    CO2 30 05/30/2025     05/30/2025    BUN  18 05/30/2025    CREATININE 0.87 05/30/2025           PROCEDURES PERFORMED:  Procedures  No Procedures performed today    Scribe Attestation      I,:  Yohana Murphy PA-C am acting as a scribe while in the presence of the attending physician.:       I,:  Praveen Du MD personally performed the services described in this documentation    as scribed in my presence.:

## 2025-07-02 NOTE — ASSESSMENT & PLAN NOTE
Sutures removed today, wounds are well healed   He does have some pain and sensitivity, particularly at the elbow. There is a small seroma which should resolve with some compression and time. Ace wrap provided today   Carpal tunnel gel sleeve provided  Discussed with the patient that his pain and sensitivity will take time to resolve. Therapy will help, he has an appointment coming up   Activity as tolerated

## 2025-07-07 ENCOUNTER — EVALUATION (OUTPATIENT)
Dept: OCCUPATIONAL THERAPY | Facility: CLINIC | Age: 60
End: 2025-07-07
Attending: SURGERY
Payer: MEDICARE

## 2025-07-07 DIAGNOSIS — Z98.890 STATUS POST CARPAL TUNNEL RELEASE: Primary | ICD-10-CM

## 2025-07-07 DIAGNOSIS — S61.210A LACERATION OF RIGHT INDEX FINGER WITHOUT FOREIGN BODY WITHOUT DAMAGE TO NAIL, INITIAL ENCOUNTER: ICD-10-CM

## 2025-07-07 PROCEDURE — 97110 THERAPEUTIC EXERCISES: CPT

## 2025-07-07 PROCEDURE — 97165 OT EVAL LOW COMPLEX 30 MIN: CPT

## 2025-07-07 NOTE — PROGRESS NOTES
OT Evaluation     Today's date: 2025  Patient name: Julito Mac  : 1965  MRN: [id number]  Referring provider: Praveen Du MD  Dx:   Encounter Diagnosis     ICD-10-CM    1. Laceration of right index finger without foreign body without damage to nail, initial encounter  S61.210A Ambulatory Referral to PT/OT Hand Therapy                     Assessment  Impairments: abnormal coordination, abnormal or restricted ROM, activity intolerance, impaired physical strength and pain with function  Symptom irritability: low    Assessment details: Patient presents to OP OT Hand Therapy services status post R carpal and cubital tunnel release. Patient surgery took place on 25. Patient symptoms had been present for over a year prior to surgery. Julito also recently sustained a laceration to the index finger that injured the digital nerve. Julito had his post operative appointment with orthopedics on 25 where the sutures were removed and a seroma of the elbow was documented. Patient surgical incision was inspected and found to be healing well, sutures removed, no evidence of infection. Mild scar tissue formation. The seroma is still present and hypersensitive to the touch. Julito reports mild numbness and tingling in all digits  [place] testing displayed decreased light touch sensation pf 3.61 in all digits. Patient presents with decreased wrist ROM .Elbow ROM is also decreased. /pinch strength is decreased in the RUE. Pinch strength was deferred secondary to index finger pain. Moderate pain located in the index, wrist, and medial elbow. Patient was provided a custom HEP and instructed on how to complete it. See below for a more detailed assessment.   Understanding of Dx/Px/POC: good     Prognosis: good    Goals  STGs:    Patient will increase ROM in wrist by >5 degrees in all planes in 4 weeks    Patient will increase  strength by 5-10 lbs in 4 weeks    Patient will report decreased pain  "during functional movement by 1-2 grades in 4 weeks    Patient will report decreased numbness and tingling in all digits in 4 weeks    Patient will demonstrate an understanding of HEP by end of initial session    LTGs:    Patient will display wrist ROM WFL in 8 weeks or discharge    Patient will increase  strength by 10-20 lbs in 8 weeks or discharge    Patient will report the resolution of numbness and tingling in all digits in 8 weeks or discharge    Patient will report resolution of pain symptoms during functional movement in 8 weeks or discharge          Plan  Patient would benefit from: custom splinting, skilled occupational therapy and OT eval  Referral necessary: No  Planned modality interventions: ultrasound, thermotherapy: hydrocollator packs and unattended electrical stimulation    Planned therapy interventions: IADL retraining, patient education, self care, manual therapy, orthotic fitting/training, strengthening, stretching, therapeutic activities, therapeutic exercise, home exercise program, functional ROM exercises and fine motor coordination training    Frequency: 2x week  Duration in weeks: 10  Plan of Care beginning date: 7/7/2025  Plan of Care expiration date: 9/7/2025  Treatment plan discussed with: patient  Plan details: Patient presenting to OP OT services s/p carpal and cubital tunnel releasel. Patient would benefit from skilled occupational therapy services 2 times per week for 8 weeks to return to prior level of function and achieve all of their established goals. Thank you for the referral.       Subjective Evaluation    History of Present Illness  Date of surgery: 6/20/2025  Mechanism of injury: surgery  Mechanism of injury: Surgery: R Carpal and Cubital Tunnel Release    Subjective:  \"The wrist gets really swollen if I do stuff\". \"It is taking a little longer than I thought\". \"The numbness and tingling is a tiny bit better\". \"The index finger feels dead\". \"I cut the digital nerve so " "the inside and the tip of the index finger is numb\". \"It was kind of numb before\". \"I was losing control of my fingers and I want to keep that\". \"The wrist feels swollen and thick\".           Recurrent probem    Quality of life: good    Patient Goals  Patient goals for therapy: decreased edema, decreased pain, increased motion, increased strength, independence with ADLs/IADLs and return to sport/leisure activities  Patient goal: Return to riding motorcycle  Pain  Current pain ratin  At best pain ratin  At worst pain ratin  Location: R elbow, R index finger  Quality: sharp  Relieving factors: relaxation, rest and support  Exacerbated by: bumping it, general ROM, riding motorcycle.  Progression: improved    Hand dominance: right    Treatments  Current treatment: occupational therapy      Objective     Observations     Right Wrist/Hand   Positive for incision.     Additional Observation Details  R:  Seroma on medial elbow  Incision at carpal tunnel    Tenderness     Additional Tenderness Details  R:  Hypersensitive at elbow and carpal tunnel incision     Neurological Testing     Sensation     Wrist/Hand     Right   Intact: light touch  Paresthesia: light touch    Comments   Right light touch: 3.61 all digits. Medial aspect of IF absent    Active Range of Motion     Left Elbow   Flexion: 140 degrees   Extension: -8 degrees   Forearm supination: 80 degrees   Forearm pronation: 90 degrees     Right Elbow   Flexion: 140 degrees   Extension: -12 degrees   Forearm supination: 75 degrees   Forearm pronation: 90 degrees     Left Wrist   Wrist flexion: 50 degrees   Wrist extension: 75 degrees     Right Wrist   Wrist flexion: 45 degrees   Wrist extension: 70 degrees     Left Thumb   Flexion     MP: 60 degrees    DIP: 72 degrees  Kapandji score: 10 degrees      Right Thumb   Flexion     MP: 24    DIP: 62  Kapandji score: 10 degrees    Strength/Myotome Testing     Left Wrist/Hand      (2nd hand position)     Trial " 1: 60    Right Wrist/Hand      (2nd hand position)     Trial 1: 26.6    Swelling     Left Wrist/Hand   Index     Proximal: 7.5 cm    Middle: 7 cm    Distal: 5.8 cm  Circumference wrist: 18.2 cm    Right Wrist/Hand   Index     Proximal: 7.5 cm    Middle: 6.9 cm    Distal: 6.2 cm  Circumference wrist: 18.5 cm               Insurance Billing Rule ReEval POC expires Auth Status Total   Visits  Start date  Expiration date PT/OT + Visit Limit? Co-Insurance Misc   Medicare CMS  9/7 N/A N/A 7/07/25 N/A N/A Yes    Capital CMS  9/7 N/A N/A 7/07 N/A N/A  Co-Insurance                                              Visit/Unit Tracking  AUTH Status: N/A Date 7/07                    Visits  Authed: N/A Used 1                     Remaining                              Precautions: Carpal and Cubital Tunnel Release Right  DOS: 6/20/25      Manuals 7/07            Scar Massage                                                     Neuro Re-Ed             Leo Roller              Vibration             MNG/ERICK                                                                 Ther Ex             HEP MNG/ERICK    Wrist/elbow ROM    Tendon glide            Wrist ROM             Elbow ROM             Med ball rolls             Wrist Maze             TB on wall             Digiflex             Ext web             Power web             Wrist PRE                                       Ther Activity             Keypegs             Colored Pegs                                                     Modalities             MHP

## 2025-07-09 ENCOUNTER — OFFICE VISIT (OUTPATIENT)
Dept: OCCUPATIONAL THERAPY | Facility: CLINIC | Age: 60
End: 2025-07-09
Attending: SURGERY
Payer: MEDICARE

## 2025-07-09 DIAGNOSIS — S61.210A LACERATION OF RIGHT INDEX FINGER WITHOUT FOREIGN BODY WITHOUT DAMAGE TO NAIL, INITIAL ENCOUNTER: Primary | ICD-10-CM

## 2025-07-09 PROCEDURE — 97110 THERAPEUTIC EXERCISES: CPT

## 2025-07-09 PROCEDURE — 97140 MANUAL THERAPY 1/> REGIONS: CPT

## 2025-07-09 NOTE — PROGRESS NOTES
"Daily Note     Today's date: 2025  Patient name: Julito Mac  : 1965  MRN: [id number]  Referring provider: Praveen Du MD  Dx:   Encounter Diagnosis     ICD-10-CM    1. Laceration of right index finger without foreign body without damage to nail, initial encounter  S61.210A                      Subjective: \"The elbow is still very sensitive\".       Objective: See treatment diary below  [id number] MHP  [id number] Manual/TE  [id number] unsup    Assessment: Tolerated treatment well. Edema at the medial elbow has decreased but is still sensitive. Hypersensitivity at the elbow persists. Elbow ROM has improved, but is still limited by pain.       Plan: Continue per plan of care.  Progress treatment as tolerated.         Insurance Billing Rule ReEval POC expires Auth Status Total   Visits  Start date  Expiration date PT/OT + Visit Limit? Co-Insurance Misc   Medicare CMS   N/A BOMN 25 N/A N/A Yes    Capital CMS   N/A BOMN  N/A N/A  Co-Insurance                                              Visit/Unit Tracking  AUTH Status: N/A Date                    Visits  Authed: N/A Used 1 1                    Remaining                              Precautions: Carpal and Cubital Tunnel Release Right  DOS: 25      Manuals            Scar Massage   8'                                                  Neuro Re-Ed             Leo Roller   4           Vibration  4           MNG/ERICK                                                                 Ther Ex             HEP MNG/ERICK    Wrist/elbow ROM    Tendon glide            Wrist ROM             Elbow ROM             Med ball rolls  3'           Wrist Maze  x5           Shoulder Pulley  4'           TB on wall  x5           Digiflex             Ext web             Power web             Wrist PRE                                       Ther Activity             Keypegs  x1           Colored Pegs                                                 "     Modalities             P  5'

## 2025-07-15 NOTE — PROGRESS NOTES
"Daily Note     Today's date: 2025  Patient name: Julito Mac  : 1965  MRN: [id number]  Referring provider: Praveen Du MD  Dx:   Encounter Diagnosis     ICD-10-CM    1. Status post carpal tunnel release  Z98.890             Start Time: 1145  Stop Time: 1230  Total time in clinic (min): 45 minutes    Subjective: \"It is a little better\"      Objective: See treatment diary below.       Assessment: Tolerated treatment well. Resolving edema in the medial elbow. Incisional tenderness at the carpal tunnel and cubital tunnel.       Plan: Continue per plan of care.  Progress treatment as tolerated.         Insurance Billing Rule ReEval POC expires Auth Status Total   Visits  Start date  Expiration date PT/OT + Visit Limit? Co-Insurance Misc   Medicare CMS   N/A BOMN 25 N/A N/A Yes    Capital CMS   N/A BOMN  N/A N/A  Co-Insurance                                              Visit/Unit Tracking  AUTH Status: N/A Date                   Visits  Authed: N/A Used 1 1 1                   Remaining                              Precautions: Carpal and Cubital Tunnel Release Right  DOS: 25      Manuals           Scar Massage   8' 8'                                                 Neuro Re-Ed             Leo Roller   4 4          Vibration  4 4          MNG/ERICK                                                                 Ther Ex             HEP MNG/ERICK    Wrist/elbow ROM    Tendon glide            Wrist ROM   3'          Elbow ROM             Med ball rolls  3'           Wrist Maze  x5           Shoulder Pulley  4'           TB on wall  x5 X5           Digiflex   Red isolated 10x, blue composite 20x           Ext web   Yellow 20x          Power web             Wrist PRE                                       Ther Activity             Keypegs  x1 x1          Colored Pegs                                                     Modalities             MHP  5' 5'           "

## 2025-07-16 ENCOUNTER — OFFICE VISIT (OUTPATIENT)
Dept: OCCUPATIONAL THERAPY | Facility: CLINIC | Age: 60
End: 2025-07-16
Attending: SURGERY
Payer: MEDICARE

## 2025-07-16 DIAGNOSIS — Z98.890 STATUS POST CARPAL TUNNEL RELEASE: Primary | ICD-10-CM

## 2025-07-16 PROCEDURE — 97110 THERAPEUTIC EXERCISES: CPT | Performed by: OCCUPATIONAL THERAPIST

## 2025-07-16 PROCEDURE — 97112 NEUROMUSCULAR REEDUCATION: CPT | Performed by: OCCUPATIONAL THERAPIST

## 2025-07-16 PROCEDURE — 97140 MANUAL THERAPY 1/> REGIONS: CPT | Performed by: OCCUPATIONAL THERAPIST

## 2025-07-17 ENCOUNTER — OFFICE VISIT (OUTPATIENT)
Dept: OCCUPATIONAL THERAPY | Facility: CLINIC | Age: 60
End: 2025-07-17
Attending: SURGERY
Payer: MEDICARE

## 2025-07-17 DIAGNOSIS — Z98.890 STATUS POST CARPAL TUNNEL RELEASE: Primary | ICD-10-CM

## 2025-07-17 DIAGNOSIS — S61.210A LACERATION OF RIGHT INDEX FINGER WITHOUT FOREIGN BODY WITHOUT DAMAGE TO NAIL, INITIAL ENCOUNTER: ICD-10-CM

## 2025-07-17 PROCEDURE — 97112 NEUROMUSCULAR REEDUCATION: CPT

## 2025-07-17 PROCEDURE — 97140 MANUAL THERAPY 1/> REGIONS: CPT

## 2025-07-17 PROCEDURE — 97110 THERAPEUTIC EXERCISES: CPT

## 2025-07-17 NOTE — PROGRESS NOTES
Daily Note     Today's date: 2025  Patient name: Julito Mac  : 1965  MRN: [id number]  Referring provider: Praveen Du MD  Dx:   Encounter Diagnosis     ICD-10-CM    1. Status post carpal tunnel release  Z98.890       2. Laceration of right index finger without foreign body without damage to nail, initial encounter  S61.210A             Subjective: (the elbow) is a little sensitive.      Objective: See treatment diary below.       Assessment: Tolerated treatment well. Issued tubigrip for elbow, and he has a gel sleeve for the CT. Continued sensitivity at the elbow.      Plan: Continue per plan of care.  Progress treatment as tolerated.         Insurance Billing Rule ReEval POC expires Auth Status Total   Visits  Start date  Expiration date PT/OT + Visit Limit? Co-Insurance Misc   Medicare CMS   N/A BOMN 25 N/A N/A Yes    Capital CMS   N/A BOMN  N/A N/A  Co-Insurance                                                Precautions: Carpal and Cubital Tunnel Release Right  DOS: 25      Manuals          Scar Massage   8' 8' 8'                                                Neuro Re-Ed             Leo Roller   4 4 4'         Vibration  4 4 4'         MNG/ERICK                                                                 Ther Ex             HEP MNG/ERICK    Wrist/elbow ROM    Tendon glide            Wrist ROM   3' 3'         Elbow ROM             Med ball rolls  3'           Wrist Maze  x5  5x         Shoulder Pulley  4'           TB on wall  x5 X5  5x         Digiflex   Red isolated 10x, blue composite 20x  Green isol 15x  Blue comp 20x         Ext web   Yellow 20x Yellow 20x         Power web             Wrist PRE                                       Ther Activity             Keypegs  x1 x1 1x         Colored Pegs                                                     Modalities             MHP  5' 5' 5'

## 2025-07-18 ENCOUNTER — APPOINTMENT (OUTPATIENT)
Dept: OCCUPATIONAL THERAPY | Facility: CLINIC | Age: 60
End: 2025-07-18
Attending: SURGERY
Payer: MEDICARE

## 2025-07-21 ENCOUNTER — OFFICE VISIT (OUTPATIENT)
Dept: OCCUPATIONAL THERAPY | Facility: CLINIC | Age: 60
End: 2025-07-21
Attending: SURGERY
Payer: MEDICARE

## 2025-07-21 DIAGNOSIS — S61.210A LACERATION OF RIGHT INDEX FINGER WITHOUT FOREIGN BODY WITHOUT DAMAGE TO NAIL, INITIAL ENCOUNTER: ICD-10-CM

## 2025-07-21 DIAGNOSIS — Z98.890 STATUS POST CARPAL TUNNEL RELEASE: Primary | ICD-10-CM

## 2025-07-21 PROCEDURE — 97530 THERAPEUTIC ACTIVITIES: CPT

## 2025-07-21 PROCEDURE — 97140 MANUAL THERAPY 1/> REGIONS: CPT

## 2025-07-21 PROCEDURE — 97110 THERAPEUTIC EXERCISES: CPT

## 2025-07-21 NOTE — PROGRESS NOTES
Daily Note     Today's date: 2025  Patient name: Julito Mac  : 1965  MRN: [id number]  Referring provider: Praveen Du MD  Dx:   Encounter Diagnosis     ICD-10-CM    1. Status post carpal tunnel release  Z98.890       2. Laceration of right index finger without foreign body without damage to nail, initial encounter  S61.210A             Subjective: Pt reports comfort with tubigrip and gel pad sleeve for CT..      Objective: See treatment diary below.       Assessment: Tolerated treatment well. Issued tubigrip for elbow, and he has a gel sleeve for the CT. Continued sensitivity at the elbow.      Plan: Continue per plan of care.  Progress treatment as tolerated.         Insurance Billing Rule ReEval POC expires Auth Status Total   Visits  Start date  Expiration date PT/OT + Visit Limit? Co-Insurance Misc   Medicare CMS   N/A BOMN 25 N/A N/A Yes    Capital CMS   N/A BOMN  N/A N/A  Co-Insurance                                                Precautions: Carpal and Cubital Tunnel Release Right  DOS: 25      Manuals         Scar Massage   8' 8' 8' 8'                                               Neuro Re-Ed             Leo Roller   4 4 4'         Vibration  4 4 4'         MNG/ERICK                                                                 Ther Ex             HEP MNG/ERICK    Wrist/elbow ROM    Tendon glide            Wrist ROM   3' 3' 3'        Elbow ROM             Med ball rolls  3'           Wrist Maze  x5  5x 5x        Shoulder Pulley  4'           TB on wall  x5 X5  5x         Digiflex   Red isolated 10x, blue composite 20x  Green isol 15x  Blue comp 20x Green isol 15x  Blue comp 20x        Ext web   Yellow 20x Yellow 20x Y 20x        Power web             Wrist PRE     3# F/E 20x                                  Ther Activity             Keypegs  x1 x1 1x 1x        Colored Pegs              Opp roll     Marbles x20        Finger add w/ marbles      3 at a time                     Modalities             [facility]  5' 5' 5' 5'

## 2025-07-23 ENCOUNTER — OFFICE VISIT (OUTPATIENT)
Dept: OCCUPATIONAL THERAPY | Facility: CLINIC | Age: 60
End: 2025-07-23
Attending: SURGERY
Payer: MEDICARE

## 2025-07-23 DIAGNOSIS — S61.210A LACERATION OF RIGHT INDEX FINGER WITHOUT FOREIGN BODY WITHOUT DAMAGE TO NAIL, INITIAL ENCOUNTER: ICD-10-CM

## 2025-07-23 DIAGNOSIS — Z98.890 STATUS POST CARPAL TUNNEL RELEASE: Primary | ICD-10-CM

## 2025-07-23 PROCEDURE — 97110 THERAPEUTIC EXERCISES: CPT

## 2025-07-23 PROCEDURE — 97140 MANUAL THERAPY 1/> REGIONS: CPT

## 2025-07-23 PROCEDURE — 97112 NEUROMUSCULAR REEDUCATION: CPT

## 2025-07-23 NOTE — PROGRESS NOTES
"Daily Note     Today's date: 2025  Patient name: Julito Mac  : 1965  MRN: [id number]  Referring provider: Praveen Du MD  Dx:   Encounter Diagnosis     ICD-10-CM    1. Status post carpal tunnel release  Z98.890       2. Laceration of right index finger without foreign body without damage to nail, initial encounter  S61.210A             Subjective:\"It is getting there little by little. There isn't really any changes in the index finger yet. The gel sleeve is the best one.       Objective: See treatment diary below.       Assessment: Tolerated treatment well. Patient hypersensitivity persists at the elbow. He continues to need to wrap the elbow and wrist with coban to prevent bumping the area. His ROM is WNL.  and wrist strength is improving.       Plan: Continue per plan of care.  Progress treatment as tolerated.         Insurance Billing Rule ReEval POC expires Auth Status Total   Visits  Start date  Expiration date PT/OT + Visit Limit? Co-Insurance Misc   Medicare CMS   N/A BOMN 25 N/A N/A Yes    Capital CMS   N/A BOMN  N/A N/A  Co-Insurance                                                Precautions: Carpal and Cubital Tunnel Release Right  DOS: 25      Manuals        Scar Massage   8' 8' 8' 8' 8'                                              Neuro Re-Ed             Leo Roller   4 4 4'  4       Vibration  4 4 4'  4       MNG/ERICK                                                                 Ther Ex             HEP MNG/ERICK    Wrist/elbow ROM    Tendon glide            Wrist ROM   3' 3' 3' 3'       Elbow ROM             Med ball rolls  3'           Wrist Maze  x5  5x 5x 5x       Shoulder Pulley  4'           TB on wall  x5 X5  5x         Digiflex   Red isolated 10x, blue composite 20x  Green isol 15x  Blue comp 20x Green isol 15x  Blue comp 20x Green isol 15x  Blue comp 20x       Ext web   Yellow 20x Yellow 20x Y 20x Y 20x       Power web    "          Wrist PRE     3# F/E 20x 3# F/E 20x                                 Ther Activity             Keypegs  x1 x1 1x 1x 1x       Colored Pegs              Opp roll     Marbles x20 Marbles x20       Finger add w/ marbles     3 at a time 3 at a time                    Modalities             MHP  5' 5' 5' 5' 5'

## 2025-07-28 ENCOUNTER — OFFICE VISIT (OUTPATIENT)
Dept: OCCUPATIONAL THERAPY | Facility: CLINIC | Age: 60
End: 2025-07-28
Attending: SURGERY
Payer: MEDICARE

## 2025-07-28 DIAGNOSIS — S61.210A LACERATION OF RIGHT INDEX FINGER WITHOUT FOREIGN BODY WITHOUT DAMAGE TO NAIL, INITIAL ENCOUNTER: ICD-10-CM

## 2025-07-28 DIAGNOSIS — Z98.890 STATUS POST CARPAL TUNNEL RELEASE: Primary | ICD-10-CM

## 2025-07-28 PROCEDURE — 97112 NEUROMUSCULAR REEDUCATION: CPT

## 2025-07-28 PROCEDURE — 97140 MANUAL THERAPY 1/> REGIONS: CPT

## 2025-07-28 PROCEDURE — 97110 THERAPEUTIC EXERCISES: CPT

## 2025-07-29 ENCOUNTER — APPOINTMENT (OUTPATIENT)
Dept: OCCUPATIONAL THERAPY | Facility: CLINIC | Age: 60
End: 2025-07-29
Attending: SURGERY
Payer: MEDICARE

## 2025-07-31 VITALS — HEIGHT: 67 IN | WEIGHT: 173.28 LBS | BODY MASS INDEX: 27.2 KG/M2

## 2025-07-31 DIAGNOSIS — S61.210D LACERATION OF RIGHT INDEX FINGER WITHOUT FOREIGN BODY WITHOUT DAMAGE TO NAIL, SUBSEQUENT ENCOUNTER: ICD-10-CM

## 2025-07-31 DIAGNOSIS — G56.01 CARPAL TUNNEL SYNDROME ON RIGHT: Primary | ICD-10-CM

## 2025-07-31 DIAGNOSIS — G56.21 CUBITAL TUNNEL SYNDROME ON RIGHT: ICD-10-CM

## 2025-07-31 PROBLEM — S61.210A LACERATION OF RIGHT INDEX FINGER WITHOUT FOREIGN BODY WITHOUT DAMAGE TO NAIL: Status: ACTIVE | Noted: 2025-07-31

## 2025-07-31 PROCEDURE — 99024 POSTOP FOLLOW-UP VISIT: CPT | Performed by: SURGERY

## 2025-08-07 ENCOUNTER — OFFICE VISIT (OUTPATIENT)
Dept: OCCUPATIONAL THERAPY | Facility: CLINIC | Age: 60
End: 2025-08-07
Attending: SURGERY
Payer: MEDICARE

## 2025-08-07 DIAGNOSIS — Z98.890 STATUS POST CARPAL TUNNEL RELEASE: Primary | ICD-10-CM

## 2025-08-07 DIAGNOSIS — S61.210A LACERATION OF RIGHT INDEX FINGER WITHOUT FOREIGN BODY WITHOUT DAMAGE TO NAIL, INITIAL ENCOUNTER: ICD-10-CM

## 2025-08-07 PROCEDURE — 97110 THERAPEUTIC EXERCISES: CPT

## 2025-08-07 PROCEDURE — 97140 MANUAL THERAPY 1/> REGIONS: CPT

## 2025-08-07 PROCEDURE — 97530 THERAPEUTIC ACTIVITIES: CPT

## 2025-08-12 ENCOUNTER — OFFICE VISIT (OUTPATIENT)
Dept: OCCUPATIONAL THERAPY | Facility: CLINIC | Age: 60
End: 2025-08-12
Attending: SURGERY
Payer: MEDICARE

## 2025-08-14 ENCOUNTER — OFFICE VISIT (OUTPATIENT)
Dept: OCCUPATIONAL THERAPY | Facility: CLINIC | Age: 60
End: 2025-08-14
Attending: SURGERY
Payer: MEDICARE

## 2025-08-20 ENCOUNTER — OFFICE VISIT (OUTPATIENT)
Dept: OCCUPATIONAL THERAPY | Facility: CLINIC | Age: 60
End: 2025-08-20
Attending: SURGERY
Payer: MEDICARE

## 2025-08-20 DIAGNOSIS — Z98.890 STATUS POST CARPAL TUNNEL RELEASE: Primary | ICD-10-CM

## 2025-08-20 DIAGNOSIS — S61.210A LACERATION OF RIGHT INDEX FINGER WITHOUT FOREIGN BODY WITHOUT DAMAGE TO NAIL, INITIAL ENCOUNTER: ICD-10-CM

## 2025-08-20 PROCEDURE — 97110 THERAPEUTIC EXERCISES: CPT

## 2025-08-20 PROCEDURE — 97140 MANUAL THERAPY 1/> REGIONS: CPT

## (undated) DEVICE — STERILE BETHLEHEM PLASTIC HAND: Brand: CARDINAL HEALTH

## (undated) DEVICE — Device

## (undated) DEVICE — PREP PAD BNS: Brand: CONVERTORS

## (undated) DEVICE — ARM SLING: Brand: DEROYAL

## (undated) DEVICE — BLADE MINI RND TIP ONE SIDE SHARP

## (undated) DEVICE — WET SKIN PREP TRAY: Brand: MEDLINE INDUSTRIES, INC.

## (undated) DEVICE — 3M™ STERI-STRIP™ REINFORCED ADHESIVE SKIN CLOSURES, R1547, 1/2 IN X 4 IN (12 MM X 100 MM), 6 STRIPS/ENVELOPE: Brand: 3M™ STERI-STRIP™

## (undated) DEVICE — TOURNIQUET HEMACLEAR 30-550CM ORANGE STRL

## (undated) DEVICE — INTENDED FOR TISSUE SEPARATION, AND OTHER PROCEDURES THAT REQUIRE A SHARP SURGICAL BLADE TO PUNCTURE OR CUT.: Brand: BARD-PARKER ® CARBON RIB-BACK BLADES

## (undated) DEVICE — KNIFE LIGHT 10,PK: Brand: KNIFELIGHT